# Patient Record
Sex: FEMALE | Race: WHITE | NOT HISPANIC OR LATINO | Employment: OTHER | URBAN - METROPOLITAN AREA
[De-identification: names, ages, dates, MRNs, and addresses within clinical notes are randomized per-mention and may not be internally consistent; named-entity substitution may affect disease eponyms.]

---

## 2023-03-15 PROBLEM — I49.3 FREQUENT PVCS: Status: ACTIVE | Noted: 2023-03-15

## 2023-03-15 PROBLEM — G89.29 CHRONIC LEFT SHOULDER PAIN: Status: ACTIVE | Noted: 2023-03-15

## 2023-03-15 PROBLEM — N81.10 PELVIC ORGAN PROLAPSE QUANTIFICATION STAGE 3 CYSTOCELE: Status: ACTIVE | Noted: 2023-03-15

## 2023-03-15 PROBLEM — R00.2 PALPITATIONS: Status: ACTIVE | Noted: 2023-03-15

## 2023-03-15 PROBLEM — M41.9 SCOLIOSIS/KYPHOSCOLIOSIS: Status: ACTIVE | Noted: 2023-03-15

## 2023-03-15 PROBLEM — F41.9 ANXIETY: Status: ACTIVE | Noted: 2023-03-15

## 2023-03-15 PROBLEM — M48.062 SPINAL STENOSIS OF LUMBAR REGION WITH NEUROGENIC CLAUDICATION: Status: ACTIVE | Noted: 2023-03-15

## 2023-03-15 PROBLEM — M17.11 PRIMARY OSTEOARTHRITIS OF RIGHT KNEE: Status: ACTIVE | Noted: 2023-03-15

## 2023-03-15 PROBLEM — E03.9 HYPOTHYROIDISM: Status: ACTIVE | Noted: 2023-03-15

## 2023-03-15 PROBLEM — M25.512 CHRONIC LEFT SHOULDER PAIN: Status: ACTIVE | Noted: 2023-03-15

## 2023-03-15 PROBLEM — K21.9 GERD (GASTROESOPHAGEAL REFLUX DISEASE): Status: ACTIVE | Noted: 2023-03-15

## 2023-03-15 PROBLEM — N81.2 CYSTOCELE WITH INCOMPLETE UTEROVAGINAL PROLAPSE: Status: ACTIVE | Noted: 2023-03-15

## 2023-03-15 PROBLEM — G47.00 INSOMNIA: Status: ACTIVE | Noted: 2023-03-15

## 2023-03-15 PROBLEM — E78.49 ESSENTIAL FAMILIAL HYPERLIPIDEMIA: Status: ACTIVE | Noted: 2023-03-15

## 2023-03-15 PROBLEM — K59.00 CONSTIPATION: Status: ACTIVE | Noted: 2023-03-15

## 2023-03-15 PROBLEM — M25.562 LEFT KNEE PAIN: Status: ACTIVE | Noted: 2023-03-15

## 2023-03-15 PROBLEM — E87.6 HYPOKALEMIA: Status: ACTIVE | Noted: 2023-03-15

## 2023-03-15 PROBLEM — L65.9 ALOPECIA: Status: ACTIVE | Noted: 2023-03-15

## 2023-03-15 PROBLEM — M54.50 LOW BACK PAIN WITH RADIATION: Status: ACTIVE | Noted: 2023-03-15

## 2023-03-15 PROBLEM — R23.2 FLUSHING REACTION: Status: ACTIVE | Noted: 2023-03-15

## 2023-03-15 PROBLEM — I49.9 REGULARLY IRREGULAR PULSE RHYTHYM: Status: ACTIVE | Noted: 2023-03-15

## 2023-03-15 PROBLEM — I10 BENIGN ESSENTIAL HYPERTENSION: Status: ACTIVE | Noted: 2023-03-15

## 2023-03-15 PROBLEM — M85.88 OSTEOPENIA OF SPINE: Status: ACTIVE | Noted: 2023-03-15

## 2023-03-15 PROBLEM — H81.10 BPV (BENIGN POSITIONAL VERTIGO): Status: ACTIVE | Noted: 2023-03-15

## 2023-03-15 PROBLEM — M25.561 RIGHT KNEE PAIN: Status: ACTIVE | Noted: 2023-03-15

## 2023-03-15 PROBLEM — M76.61 ACHILLES TENDINITIS OF RIGHT LOWER EXTREMITY: Status: ACTIVE | Noted: 2023-03-15

## 2023-03-15 PROBLEM — R00.1 BRADYCARDIA: Status: ACTIVE | Noted: 2023-03-15

## 2023-03-15 PROBLEM — M19.041 PRIMARY OSTEOARTHRITIS OF BOTH HANDS: Status: ACTIVE | Noted: 2023-03-15

## 2023-03-15 PROBLEM — I25.10 ASCVD (ARTERIOSCLEROTIC CARDIOVASCULAR DISEASE): Status: ACTIVE | Noted: 2023-03-15

## 2023-03-15 PROBLEM — I45.10 COMPLETE RIGHT BUNDLE BRANCH BLOCK: Status: ACTIVE | Noted: 2023-03-15

## 2023-03-15 PROBLEM — G89.29 CHRONIC PAIN: Status: ACTIVE | Noted: 2023-03-15

## 2023-03-15 PROBLEM — R42 FEELING LIGHT HEADED: Status: ACTIVE | Noted: 2023-03-15

## 2023-03-15 PROBLEM — I49.5 TACHY-BRADY SYNDROME (MULTI): Status: ACTIVE | Noted: 2023-03-15

## 2023-03-15 PROBLEM — K58.9 IBS (IRRITABLE COLON SYNDROME): Status: ACTIVE | Noted: 2023-03-15

## 2023-03-15 PROBLEM — R14.0 ABDOMINAL BLOATING: Status: ACTIVE | Noted: 2023-03-15

## 2023-03-15 PROBLEM — N95.2 ATROPHY OF VAGINA: Status: ACTIVE | Noted: 2023-03-15

## 2023-03-15 PROBLEM — I49.8 BIGEMINY: Status: ACTIVE | Noted: 2023-03-15

## 2023-03-15 PROBLEM — M47.9 OSTEOARTHRITIS OF SPINE: Status: ACTIVE | Noted: 2023-03-15

## 2023-03-15 PROBLEM — R23.2 HOT FLASHES: Status: ACTIVE | Noted: 2023-03-15

## 2023-03-15 PROBLEM — F41.8 DEPRESSION WITH ANXIETY: Status: ACTIVE | Noted: 2023-03-15

## 2023-03-15 PROBLEM — E87.5 HYPERKALEMIA: Status: ACTIVE | Noted: 2023-03-15

## 2023-03-15 PROBLEM — R10.30 ABDOMINAL PAIN, LOWER: Status: ACTIVE | Noted: 2023-03-15

## 2023-03-15 PROBLEM — M19.042 PRIMARY OSTEOARTHRITIS OF BOTH HANDS: Status: ACTIVE | Noted: 2023-03-15

## 2023-03-15 PROBLEM — G89.18 POSTOPERATIVE PAIN: Status: ACTIVE | Noted: 2023-03-15

## 2023-03-15 PROBLEM — N39.3 FEMALE STRESS INCONTINENCE: Status: ACTIVE | Noted: 2023-03-15

## 2023-03-15 PROBLEM — H93.19 TINNITUS: Status: ACTIVE | Noted: 2023-03-15

## 2023-03-15 PROBLEM — E06.3 HASHIMOTO'S THYROIDITIS: Status: ACTIVE | Noted: 2023-03-15

## 2023-03-15 PROBLEM — R11.0 NAUSEA IN ADULT: Status: ACTIVE | Noted: 2023-03-15

## 2023-03-15 RX ORDER — IPRATROPIUM BROMIDE 42 UG/1
2 SPRAY, METERED NASAL 4 TIMES DAILY
COMMUNITY
End: 2024-02-19 | Stop reason: SDUPTHER

## 2023-03-15 RX ORDER — HYALURONATE SODIUM, STABILIZED 60 MG/3 ML
60 SYRINGE (ML) INTRAARTICULAR ONCE
COMMUNITY

## 2023-03-15 RX ORDER — ELECTROLYTES/DEXTROSE
5 SOLUTION, ORAL ORAL DAILY
COMMUNITY
Start: 2018-02-08

## 2023-03-15 RX ORDER — ONDANSETRON 4 MG/1
4 TABLET, ORALLY DISINTEGRATING ORAL EVERY 6 HOURS PRN
COMMUNITY
End: 2024-02-13 | Stop reason: SDUPTHER

## 2023-03-15 RX ORDER — CHOLECALCIFEROL (VITAMIN D3) 50 MCG
1 TABLET ORAL DAILY
COMMUNITY
Start: 2017-12-15

## 2023-03-15 RX ORDER — ZOLPIDEM TARTRATE 10 MG/1
.5-1 TABLET ORAL NIGHTLY PRN
COMMUNITY
Start: 2016-10-19 | End: 2023-05-12 | Stop reason: SDUPTHER

## 2023-03-15 RX ORDER — LEVOTHYROXINE SODIUM 100 UG/1
1 TABLET ORAL DAILY
COMMUNITY
Start: 2021-04-29 | End: 2023-05-12 | Stop reason: SDUPTHER

## 2023-03-15 RX ORDER — SIMVASTATIN 20 MG/1
20 TABLET, FILM COATED ORAL NIGHTLY
COMMUNITY
Start: 2016-10-19 | End: 2023-03-22 | Stop reason: SDUPTHER

## 2023-03-15 RX ORDER — BETAMETHASONE VALERATE 1.2 MG/G
AEROSOL, FOAM TOPICAL
COMMUNITY
Start: 2022-05-23

## 2023-03-15 RX ORDER — AMLODIPINE BESYLATE 5 MG/1
5 TABLET ORAL NIGHTLY
COMMUNITY
Start: 2022-04-28 | End: 2023-05-22 | Stop reason: SDUPTHER

## 2023-03-15 RX ORDER — BUSPIRONE HYDROCHLORIDE 5 MG/1
5 TABLET ORAL 2 TIMES DAILY
COMMUNITY
End: 2023-03-16 | Stop reason: ALTCHOICE

## 2023-03-16 ENCOUNTER — OFFICE VISIT (OUTPATIENT)
Dept: PRIMARY CARE | Facility: CLINIC | Age: 88
End: 2023-03-16
Payer: MEDICARE

## 2023-03-16 VITALS
OXYGEN SATURATION: 97 % | HEART RATE: 71 BPM | DIASTOLIC BLOOD PRESSURE: 77 MMHG | SYSTOLIC BLOOD PRESSURE: 127 MMHG | RESPIRATION RATE: 12 BRPM

## 2023-03-16 DIAGNOSIS — I10 BENIGN ESSENTIAL HYPERTENSION: Primary | ICD-10-CM

## 2023-03-16 DIAGNOSIS — Z00.00 HEALTH CARE MAINTENANCE: ICD-10-CM

## 2023-03-16 DIAGNOSIS — F41.9 ANXIETY: ICD-10-CM

## 2023-03-16 PROCEDURE — 3074F SYST BP LT 130 MM HG: CPT | Performed by: INTERNAL MEDICINE

## 2023-03-16 PROCEDURE — 3078F DIAST BP <80 MM HG: CPT | Performed by: INTERNAL MEDICINE

## 2023-03-16 PROCEDURE — 99213 OFFICE O/P EST LOW 20 MIN: CPT | Performed by: INTERNAL MEDICINE

## 2023-03-16 RX ORDER — BUSPIRONE HYDROCHLORIDE 5 MG/1
5 TABLET ORAL 2 TIMES DAILY
Qty: 60 TABLET | Refills: 0 | Status: SHIPPED | OUTPATIENT
Start: 2023-03-16 | End: 2023-09-14

## 2023-03-16 NOTE — PROGRESS NOTES
Subjective   Patient ID: Pastora Dowling is a 87 y.o. female who presents for No chief complaint on file..    HPI follow-up visit and sick visit has been anxious she had been on BuSpar previously has not refilled has been on gabapentin previously prefers not to take she is sleeping better with the zolpidem no chest pain no shortness of breath but sometimes feels her heart pounding in her chest but not particularly fast or slow    OARRS:  No data recorded  I have personally reviewed the OARRS report for Pastora Dowling. I have considered the risks of abuse, dependence, addiction and diversion    Is the patient prescribed a combination of a benzodiazepine and opioid?  Yes, I feel it is clincially indicated to continue the medication and have discussed with the patient risks/benefits/alternatives.    Last Urine Drug Screen / ordered today: No  Recent Results (from the past 93484 hour(s))   Drug Screen, Urine With Reflex to Confirmation    Collection Time: 02/02/23 11:36 AM   Result Value Ref Range    DRUG SCREEN COMMENT URINE SEE BELOW     Amphetamine Screen, Urine PRESUMPTIVE NEGATIVE NEGATIVE    Barbiturate Screen, Urine PRESUMPTIVE NEGATIVE NEGATIVE    BENZODIAZEPINE (PRESENCE) IN URINE BY SCREEN METHOD PRESUMPTIVE NEGATIVE NEGATIVE    Cannabinoid Screen, Urine PRESUMPTIVE NEGATIVE NEGATIVE    Cocaine Screen, Urine PRESUMPTIVE NEGATIVE NEGATIVE    Fentanyl, Ur PRESUMPTIVE NEGATIVE NEGATIVE    Methadone Screen, Urine PRESUMPTIVE NEGATIVE NEGATIVE    Opiate Screen, Urine PRESUMPTIVE NEGATIVE NEGATIVE    Oxycodone Screen, Ur PRESUMPTIVE NEGATIVE NEGATIVE    PCP Screen, Urine PRESUMPTIVE NEGATIVE NEGATIVE     Results are as expected.     Controlled Substance Agreement:  Date of the Last Agreement: na  Reviewed Controlled Substance Agreement including but not limited to the benefits, risks, and alternatives to treatment with a Controlled Substance medication(s).    Sleep Aids:   What is the patient's goal of  therapy? sleep  Is this being achieved with current treatment? y    Activities of Daily Living:   Is your overall impression that this patient is benefiting (symptom reduction outweighs side effects) from sleep aid therapy? Yes     1. Physical Functioning: Same  2. Family Relationship: Same  3. Social Relationship: Same  4. Mood: Same  5. Sleep Patterns: Same  6. Overall Function: Same    Review of Systems    Objective   There were no vitals taken for this visit.    Physical Exam vital signs noted alert and oriented x3 NCAT no JVD or bruit chest clear to auscultation and percussion CV regular rate and rhythm S1-S2 without murmur gallop or rub except for 1 out of 6 systolic ejection murmur extremities no tremor no clubbing cyanosis or edema normal distal pulses    Assessment/Plan impression hypertension anxiety  Plan okay to resume BuSpar 5 mg 1 p.o. twice daily for at least 1 month and then reassess discussed with nonpharmacological measures for decreasing anxiety continue with blood pressure management and medication watching diet sleep hygiene continue with Ambien if helpful discussed with daughter recheck 1 month

## 2023-03-22 DIAGNOSIS — E78.2 MIXED HYPERLIPIDEMIA: Primary | ICD-10-CM

## 2023-03-24 RX ORDER — SIMVASTATIN 20 MG/1
20 TABLET, FILM COATED ORAL NIGHTLY
Qty: 90 TABLET | Refills: 3 | Status: SHIPPED | OUTPATIENT
Start: 2023-03-24 | End: 2023-06-21 | Stop reason: SDUPTHER

## 2023-04-03 RX ORDER — ZOLPIDEM TARTRATE 10 MG/1
5-10 TABLET ORAL NIGHTLY PRN
Qty: 30 TABLET | Status: CANCELLED | OUTPATIENT
Start: 2023-04-03

## 2023-04-09 RX ORDER — ZOLPIDEM TARTRATE 10 MG/1
5-10 TABLET ORAL NIGHTLY PRN
Qty: 30 TABLET | Refills: 0 | OUTPATIENT
Start: 2023-04-09

## 2023-04-20 DIAGNOSIS — R30.0 DYSURIA: Primary | ICD-10-CM

## 2023-05-05 ENCOUNTER — TELEPHONE (OUTPATIENT)
Dept: PRIMARY CARE | Facility: CLINIC | Age: 88
End: 2023-05-05

## 2023-05-09 RX ORDER — ZOLPIDEM TARTRATE 10 MG/1
5-10 TABLET ORAL NIGHTLY PRN
Status: CANCELLED | OUTPATIENT
Start: 2023-05-09

## 2023-05-12 ENCOUNTER — OFFICE VISIT (OUTPATIENT)
Dept: PRIMARY CARE | Facility: CLINIC | Age: 88
End: 2023-05-12
Payer: MEDICARE

## 2023-05-12 VITALS — SYSTOLIC BLOOD PRESSURE: 129 MMHG | DIASTOLIC BLOOD PRESSURE: 76 MMHG

## 2023-05-12 DIAGNOSIS — F41.9 ANXIETY: ICD-10-CM

## 2023-05-12 DIAGNOSIS — K59.00 CONSTIPATION, UNSPECIFIED CONSTIPATION TYPE: ICD-10-CM

## 2023-05-12 DIAGNOSIS — Z00.00 HEALTH CARE MAINTENANCE: ICD-10-CM

## 2023-05-12 DIAGNOSIS — F51.01 PRIMARY INSOMNIA: ICD-10-CM

## 2023-05-12 DIAGNOSIS — M54.50 LOW BACK PAIN WITH RADIATION: Primary | ICD-10-CM

## 2023-05-12 DIAGNOSIS — I10 BENIGN ESSENTIAL HYPERTENSION: ICD-10-CM

## 2023-05-12 PROCEDURE — 3078F DIAST BP <80 MM HG: CPT | Performed by: INTERNAL MEDICINE

## 2023-05-12 PROCEDURE — 99213 OFFICE O/P EST LOW 20 MIN: CPT | Performed by: INTERNAL MEDICINE

## 2023-05-12 PROCEDURE — 3074F SYST BP LT 130 MM HG: CPT | Performed by: INTERNAL MEDICINE

## 2023-05-12 RX ORDER — ZOLPIDEM TARTRATE 10 MG/1
5-10 TABLET ORAL NIGHTLY PRN
Qty: 30 TABLET | Refills: 0 | Status: SHIPPED | OUTPATIENT
Start: 2023-05-12 | End: 2023-06-10 | Stop reason: SDUPTHER

## 2023-05-12 RX ORDER — MELOXICAM 7.5 MG/1
7.5 TABLET ORAL DAILY
Qty: 30 TABLET | Refills: 0 | Status: SHIPPED | OUTPATIENT
Start: 2023-05-12 | End: 2023-06-11

## 2023-05-12 RX ORDER — LEVOTHYROXINE SODIUM 100 UG/1
100 TABLET ORAL DAILY
Qty: 90 TABLET | Refills: 1 | Status: SHIPPED | OUTPATIENT
Start: 2023-05-12 | End: 2023-08-07 | Stop reason: SDUPTHER

## 2023-05-12 NOTE — PROGRESS NOTES
Subjective   Patient ID: Pastora Dowling is a 88 y.o. female who presents for No chief complaint on file..    HPI follow-up visit no chest pain no shortness of breath not sleeping well Ambien works for her she has had no subsequent falls she has been trying to find a new rheumatologist bowels have been slow    Review of Systems    Objective   There were no vitals taken for this visit.    Physical Exam vital signs noted alert and oriented x3 NCAT no JVD or bruit chest clear to auscultation and percussion CV regular rate and rhythm S1-S2 abdomen soft nontender normal active bowel sounds slightly distended LS spine nontender spinous process negative straight leg raise extremities no clubbing cyanosis or edema normal distal pulses    Assessment/Plan impression hypertension?  Anxiety?  Low back and hip buttock discomfort constipation versus gas and bloating  Plan okay to add stool softener 100 mg p.o. daily refill levothyroxine see EMR refill Ambien see EMR risk-benefit side effect reviewed with her and wishes to proceed review OARRS report medication agreement recent urinary screening test discussed with other medications watching diet increase fiber discussed with other gas and bloating medications okay for new rheumatology referral to be made and refill meloxicam 7.5 mg p.o. as needed take with food see EMR and recheck after that    Rheumatology referral

## 2023-05-22 DIAGNOSIS — I10 BENIGN ESSENTIAL HYPERTENSION: Primary | ICD-10-CM

## 2023-05-22 RX ORDER — AMLODIPINE BESYLATE 5 MG/1
5 TABLET ORAL NIGHTLY
Qty: 30 TABLET | Refills: 1 | Status: SHIPPED | OUTPATIENT
Start: 2023-05-22 | End: 2023-06-08 | Stop reason: SDUPTHER

## 2023-06-05 ENCOUNTER — OFFICE VISIT (OUTPATIENT)
Dept: PRIMARY CARE | Facility: CLINIC | Age: 88
End: 2023-06-05
Payer: MEDICARE

## 2023-06-05 ENCOUNTER — LAB (OUTPATIENT)
Dept: LAB | Facility: LAB | Age: 88
End: 2023-06-05
Payer: MEDICARE

## 2023-06-05 VITALS — DIASTOLIC BLOOD PRESSURE: 70 MMHG | SYSTOLIC BLOOD PRESSURE: 130 MMHG

## 2023-06-05 DIAGNOSIS — I10 BENIGN ESSENTIAL HYPERTENSION: ICD-10-CM

## 2023-06-05 DIAGNOSIS — F51.01 PRIMARY INSOMNIA: ICD-10-CM

## 2023-06-05 DIAGNOSIS — H61.21 IMPACTED CERUMEN OF RIGHT EAR: ICD-10-CM

## 2023-06-05 DIAGNOSIS — R30.0 DYSURIA: ICD-10-CM

## 2023-06-05 DIAGNOSIS — R14.0 ABDOMINAL BLOATING: ICD-10-CM

## 2023-06-05 DIAGNOSIS — R14.0 ABDOMINAL BLOATING: Primary | ICD-10-CM

## 2023-06-05 LAB
APPEARANCE, URINE: CLEAR
BILIRUBIN, URINE: NEGATIVE
BLOOD, URINE: NEGATIVE
COLOR, URINE: YELLOW
DEAMIDATED GLIADIN PEPTIDE IGA: <1 U/ML (ref 0–14)
DEAMIDATED GLIADIN PEPTIDE IGG: <1 U/ML (ref 0–14)
GLUCOSE, URINE: NEGATIVE MG/DL
KETONES, URINE: NEGATIVE MG/DL
LEUKOCYTE ESTERASE, URINE: NEGATIVE
NITRITE, URINE: NEGATIVE
PH, URINE: 5 (ref 5–8)
PROTEIN, URINE: NEGATIVE MG/DL
SPECIFIC GRAVITY, URINE: 1.03 (ref 1–1.03)
TISSUE TRANSGLUTAMINASE IGG: <1 U/ML (ref 0–14)
TISSUE TRANSGLUTAMINASE, IGA: <1 U/ML (ref 0–14)
UROBILINOGEN, URINE: <2 MG/DL (ref 0–1.9)

## 2023-06-05 PROCEDURE — 36415 COLL VENOUS BLD VENIPUNCTURE: CPT

## 2023-06-05 PROCEDURE — 3075F SYST BP GE 130 - 139MM HG: CPT | Performed by: INTERNAL MEDICINE

## 2023-06-05 PROCEDURE — 3078F DIAST BP <80 MM HG: CPT | Performed by: INTERNAL MEDICINE

## 2023-06-05 PROCEDURE — 81003 URINALYSIS AUTO W/O SCOPE: CPT

## 2023-06-05 PROCEDURE — 83516 IMMUNOASSAY NONANTIBODY: CPT

## 2023-06-05 PROCEDURE — 99213 OFFICE O/P EST LOW 20 MIN: CPT | Performed by: INTERNAL MEDICINE

## 2023-06-05 PROCEDURE — 69210 REMOVE IMPACTED EAR WAX UNI: CPT | Performed by: INTERNAL MEDICINE

## 2023-06-05 NOTE — PROGRESS NOTES
celiaSubjective   Patient ID: Pastora Dowling is a 88 y.o. female who presents for No chief complaint on file..    HPI follow-up visit no chest pain no shortness of breath having some lower abdominal gas bloating her family was tested for celiac disease she would like to be tested also may consider some antispasmodic if she has been having more regular bowel movements lately she had been to the audiologist and has cerumen impaction in her right ear her hearing aid is not working well at that point in time had dropped her entire pillbox some of which went down the vent and her bathroom she had been to the orthopedic surgeon and had pelvic x-rays done she is on some Celebrex    Review of Systems    Objective   There were no vitals taken for this visit.    Physical Exam vital signs noted alert and oriented x3 NCAT TM EACs clear except for right EAC with cerumen and her hearing aid and ear canal this was cleaned and loop removed with satisfaction TM opaque replacement of the earpiece now can hear without tinnitus no JVD chest clear to auscultation CV regular rate and rhythm S1-S2 abdomen soft nontender normal active bowel sounds some lower abdomen tympany but no different than before extremities no clubbing cyanosis or edema normal distal pulses walking with cane    Procedure note right EAC with cerumen impaction multiple loop removals and now clear TM visible opaque no complications hearing improved as well as subjective tinnitus may recheck in the future    Assessment/Plan impression abdominal gas and bloating cerumen impaction insomnia other diagnoses hypertension  Plan blood pressure adequate today May need early refill on her amlodipine Ambien risk-benefit side effect reviewed with her and wishes to proceed recheck OARRS report sleep hygiene may need some Bentyl for her abdomen she will check celiac antibodies evidently other family members have this she would like to check we will follow-up with audiologist  good diet regular exercise increase water consumption recheck if no better and for regular visit    Prescriptions for amlodipine Ambien and Bentyl (check)  Celiac antibodies (check)

## 2023-06-08 DIAGNOSIS — E78.2 MIXED HYPERLIPIDEMIA: ICD-10-CM

## 2023-06-08 DIAGNOSIS — Z00.00 HEALTH CARE MAINTENANCE: Primary | ICD-10-CM

## 2023-06-08 DIAGNOSIS — I10 BENIGN ESSENTIAL HYPERTENSION: ICD-10-CM

## 2023-06-10 DIAGNOSIS — F51.01 PRIMARY INSOMNIA: ICD-10-CM

## 2023-06-10 RX ORDER — DICYCLOMINE HYDROCHLORIDE 10 MG/1
10 CAPSULE ORAL 2 TIMES DAILY PRN
Qty: 30 CAPSULE | Refills: 0 | Status: SHIPPED | OUTPATIENT
Start: 2023-06-10 | End: 2023-07-10

## 2023-06-10 RX ORDER — ZOLPIDEM TARTRATE 10 MG/1
5-10 TABLET ORAL NIGHTLY PRN
Qty: 30 TABLET | Refills: 0 | Status: SHIPPED | OUTPATIENT
Start: 2023-06-10 | End: 2023-07-11 | Stop reason: SDUPTHER

## 2023-06-10 RX ORDER — AMLODIPINE BESYLATE 5 MG/1
5 TABLET ORAL NIGHTLY
Qty: 30 TABLET | Refills: 1 | Status: SHIPPED | OUTPATIENT
Start: 2023-06-10 | End: 2023-08-22 | Stop reason: SDUPTHER

## 2023-06-21 RX ORDER — SIMVASTATIN 20 MG/1
20 TABLET, FILM COATED ORAL NIGHTLY
Qty: 90 TABLET | Refills: 3 | Status: SHIPPED | OUTPATIENT
Start: 2023-06-21 | End: 2024-05-03 | Stop reason: SDUPTHER

## 2023-07-11 DIAGNOSIS — F51.01 PRIMARY INSOMNIA: ICD-10-CM

## 2023-07-13 RX ORDER — ZOLPIDEM TARTRATE 10 MG/1
5 TABLET ORAL NIGHTLY PRN
Qty: 30 TABLET | Refills: 0 | Status: SHIPPED | OUTPATIENT
Start: 2023-07-13 | End: 2023-08-07 | Stop reason: SDUPTHER

## 2023-07-19 ENCOUNTER — OFFICE VISIT (OUTPATIENT)
Dept: PRIMARY CARE | Facility: CLINIC | Age: 88
End: 2023-07-19
Payer: MEDICARE

## 2023-07-19 VITALS — HEART RATE: 66 BPM | DIASTOLIC BLOOD PRESSURE: 78 MMHG | SYSTOLIC BLOOD PRESSURE: 123 MMHG | RESPIRATION RATE: 12 BRPM

## 2023-07-19 DIAGNOSIS — F41.9 ANXIETY: Primary | ICD-10-CM

## 2023-07-19 DIAGNOSIS — F51.01 PRIMARY INSOMNIA: ICD-10-CM

## 2023-07-19 DIAGNOSIS — I10 BENIGN ESSENTIAL HYPERTENSION: ICD-10-CM

## 2023-07-19 PROCEDURE — 99213 OFFICE O/P EST LOW 20 MIN: CPT | Performed by: INTERNAL MEDICINE

## 2023-07-19 PROCEDURE — 3074F SYST BP LT 130 MM HG: CPT | Performed by: INTERNAL MEDICINE

## 2023-07-19 PROCEDURE — 3078F DIAST BP <80 MM HG: CPT | Performed by: INTERNAL MEDICINE

## 2023-07-19 PROCEDURE — 1125F AMNT PAIN NOTED PAIN PRSNT: CPT | Performed by: INTERNAL MEDICINE

## 2023-07-19 NOTE — PROGRESS NOTES
Subjective   Patient ID: Pastora Dowling is a 88 y.o. female who presents for No chief complaint on file..    HPI follow-up visit no chest pain no shortness of breath no side effect with medication risk-benefit side effect of her sleeping medications reviewed with her and wishes to proceed heart rates have been okay    Review of Systems    Objective   There were no vitals taken for this visit.  Vital signs noted alert and oriented x3 NCAT no JVD or bruit chest clear to auscultation and percussion CV regular rate and rhythm S1-S2 without murmur gallop or rub extremities no clubbing cyanosis or edema normal distal pulses  Physical Exam    Assessment/Plan    impression hypertension insomnia less alopecia anxiety  Plan risk-benefit side effect of Ambien reviewed with her and wishes to proceed sleep hygiene discussed with other medications good diet multivitamin regular exercise increase water consumption hair is growing back she feels this may be due to a somewhat lessened anxiety no additional treatment needed here continue with blood pressure management and medication recheck 3 months based on above

## 2023-08-07 ENCOUNTER — TELEPHONE (OUTPATIENT)
Dept: PRIMARY CARE | Facility: CLINIC | Age: 88
End: 2023-08-07
Payer: COMMERCIAL

## 2023-08-07 DIAGNOSIS — F51.01 PRIMARY INSOMNIA: ICD-10-CM

## 2023-08-07 DIAGNOSIS — Z00.00 HEALTH CARE MAINTENANCE: ICD-10-CM

## 2023-08-09 RX ORDER — ZOLPIDEM TARTRATE 10 MG/1
5 TABLET ORAL NIGHTLY PRN
Qty: 30 TABLET | Refills: 0 | Status: SHIPPED | OUTPATIENT
Start: 2023-08-09 | End: 2023-09-05 | Stop reason: SDUPTHER

## 2023-08-09 RX ORDER — LEVOTHYROXINE SODIUM 100 UG/1
100 TABLET ORAL DAILY
Qty: 90 TABLET | Refills: 1 | Status: SHIPPED | OUTPATIENT
Start: 2023-08-09 | End: 2023-11-08 | Stop reason: SDUPTHER

## 2023-08-22 DIAGNOSIS — I10 BENIGN ESSENTIAL HYPERTENSION: ICD-10-CM

## 2023-08-22 RX ORDER — AMLODIPINE BESYLATE 5 MG/1
5 TABLET ORAL NIGHTLY
Qty: 30 TABLET | Refills: 1 | Status: SHIPPED | OUTPATIENT
Start: 2023-08-22 | End: 2023-08-28 | Stop reason: SDUPTHER

## 2023-08-28 ENCOUNTER — TELEPHONE (OUTPATIENT)
Dept: PRIMARY CARE | Facility: CLINIC | Age: 88
End: 2023-08-28
Payer: COMMERCIAL

## 2023-08-28 DIAGNOSIS — I10 BENIGN ESSENTIAL HYPERTENSION: ICD-10-CM

## 2023-08-30 RX ORDER — AMLODIPINE BESYLATE 5 MG/1
5 TABLET ORAL NIGHTLY
Qty: 30 TABLET | Refills: 1 | Status: SHIPPED | OUTPATIENT
Start: 2023-08-30 | End: 2023-09-20 | Stop reason: SDUPTHER

## 2023-09-05 DIAGNOSIS — F51.01 PRIMARY INSOMNIA: ICD-10-CM

## 2023-09-07 RX ORDER — ZOLPIDEM TARTRATE 10 MG/1
5 TABLET ORAL NIGHTLY PRN
Qty: 30 TABLET | Refills: 0 | Status: SHIPPED | OUTPATIENT
Start: 2023-09-07 | End: 2023-09-14 | Stop reason: SDUPTHER

## 2023-09-08 DIAGNOSIS — F51.01 PRIMARY INSOMNIA: ICD-10-CM

## 2023-09-09 RX ORDER — ZOLPIDEM TARTRATE 10 MG/1
5 TABLET ORAL NIGHTLY PRN
Qty: 30 TABLET | Refills: 0 | OUTPATIENT
Start: 2023-09-09

## 2023-09-11 DIAGNOSIS — F51.01 PRIMARY INSOMNIA: ICD-10-CM

## 2023-09-11 RX ORDER — ZOLPIDEM TARTRATE 5 MG/1
5 TABLET ORAL NIGHTLY PRN
Qty: 30 TABLET | Refills: 0 | Status: CANCELLED | OUTPATIENT
Start: 2023-09-11

## 2023-09-14 ENCOUNTER — OFFICE VISIT (OUTPATIENT)
Dept: PRIMARY CARE | Facility: CLINIC | Age: 88
End: 2023-09-14
Payer: MEDICARE

## 2023-09-14 VITALS — HEART RATE: 66 BPM | DIASTOLIC BLOOD PRESSURE: 74 MMHG | SYSTOLIC BLOOD PRESSURE: 126 MMHG | RESPIRATION RATE: 12 BRPM

## 2023-09-14 DIAGNOSIS — I10 BENIGN ESSENTIAL HYPERTENSION: ICD-10-CM

## 2023-09-14 DIAGNOSIS — R14.0 ABDOMINAL BLOATING: ICD-10-CM

## 2023-09-14 DIAGNOSIS — F51.01 PRIMARY INSOMNIA: ICD-10-CM

## 2023-09-14 DIAGNOSIS — F41.9 ANXIETY: Primary | ICD-10-CM

## 2023-09-14 PROCEDURE — 99214 OFFICE O/P EST MOD 30 MIN: CPT | Performed by: INTERNAL MEDICINE

## 2023-09-14 PROCEDURE — 1125F AMNT PAIN NOTED PAIN PRSNT: CPT | Performed by: INTERNAL MEDICINE

## 2023-09-14 PROCEDURE — 3074F SYST BP LT 130 MM HG: CPT | Performed by: INTERNAL MEDICINE

## 2023-09-14 PROCEDURE — 3078F DIAST BP <80 MM HG: CPT | Performed by: INTERNAL MEDICINE

## 2023-09-14 RX ORDER — ZOLPIDEM TARTRATE 10 MG/1
TABLET ORAL
Qty: 30 TABLET | Refills: 0 | Status: SHIPPED | OUTPATIENT
Start: 2023-09-14 | End: 2023-10-13 | Stop reason: SDUPTHER

## 2023-09-14 RX ORDER — PAROXETINE 10 MG/1
TABLET, FILM COATED ORAL
Qty: 30 TABLET | Refills: 1 | Status: SHIPPED | OUTPATIENT
Start: 2023-09-14

## 2023-09-14 NOTE — PROGRESS NOTES
Subjective   Patient ID: Pastora Dowling is a 88 y.o. female who presents for No chief complaint on file..    HPI follow-up visit no chest pain no shortness of breath has been more anxious she had lost some of her Ambien medication inadvertently had swept it into the garbage while cleaning had been using 10 mg at night she had leftover trazodone but did not use that she has approximately 5 of those tablets left no side effect with medications risk-benefit side effect of Ambien reviewed with her and wishes to proceed bowels normal to slow sometimes feels bloated no dysuria   Review of Systems    Objective   There were no vitals taken for this visit.  Vital signs noted alert and oriented x3 NCAT anxious color is good TM EAC clear she had asked no JVD or bruit chest clear to auscultation and percussion CV regular rate and rhythm S1-S2 without murmur gallop or rub abdomen soft nontender nondistended normal active bowel sounds no flank tenderness extremities no clubbing cyanosis or edema normal distal pulses  Physical Exam    Assessment/Plan    impression hypertension insomnia abdominal gas and bloating other diagnoses anxiety  Plan discussed with anxiety she had tried buspirone in the past okay for low-dose Paxil 10 mg p.o. every morning discussed with use see EMR she has approximately 5 of the 50 mg trazodone's left at home discussed with not using sleeping pills together but if it make him to pass that she is out of sleeping pills forgot them lost them or they are not available she may take 1 of 50 mg trazodone at night while she awaits any future refills of Ambien check OARRS report medication screen for urineMedication agreement discussed with pain medication she is taking Tylenol she is seeing the rheumatologist she may have some tramadol left at home discussed with use and follow-up with rheumatology sleep hygiene continue with blood pressure medication for the abdomen discussed with food selection good water  consumption regular exercise occasional antacid review prior laboratory results recheck 1 to 3 months based on above  [id number] [id number]    Review prior laboratory results (check)  any abdominal imaging (CT scan) checkOARRS:  No data recorded  I have personally reviewed the OARRS report for Pastora Dowling. I have considered the risks of abuse, dependence, addiction and diversion    Is the patient prescribed a combination of a benzodiazepine and opioid?  No    Last Urine Drug Screen / ordered today: No  Recent Results (from the past 8760 hour(s))   Drug Screen, Urine With Reflex to Confirmation    Collection Time: 02/02/23 11:36 AM   Result Value Ref Range    DRUG SCREEN COMMENT URINE SEE BELOW     Amphetamine Screen, Urine PRESUMPTIVE NEGATIVE NEGATIVE    Barbiturate Screen, Urine PRESUMPTIVE NEGATIVE NEGATIVE    BENZODIAZEPINE (PRESENCE) IN URINE BY SCREEN METHOD PRESUMPTIVE NEGATIVE NEGATIVE    Cannabinoid Screen, Urine PRESUMPTIVE NEGATIVE NEGATIVE    Cocaine Screen, Urine PRESUMPTIVE NEGATIVE NEGATIVE    Fentanyl, Ur PRESUMPTIVE NEGATIVE NEGATIVE    Methadone Screen, Urine PRESUMPTIVE NEGATIVE NEGATIVE    Opiate Screen, Urine PRESUMPTIVE NEGATIVE NEGATIVE    Oxycodone Screen, Ur PRESUMPTIVE NEGATIVE NEGATIVE    PCP Screen, Urine PRESUMPTIVE NEGATIVE NEGATIVE     N/A  Clinical rationale for not completing a Urine Drug Screen:  not due yet    Controlled Substance Agreement:  Date of the Last Agreement: 2023  Reviewed Controlled Substance Agreement including but not limited to the benefits, risks, and alternatives to treatment with a Controlled Substance medication(s).    Sleep Aids:   What is the patient's goal of therapy? sleep  Is this being achieved with current treatment? y    Activities of Daily Living:   Is your overall impression that this patient is benefiting (symptom reduction outweighs side effects) from sleep aid therapy? Yes     1. Physical Functioning: Same  2. Family Relationship: Same  3. Social  Relationship: Same  4. Mood: Same  5. Sleep Patterns: Same  6. Overall Function: Same

## 2023-09-20 DIAGNOSIS — I10 BENIGN ESSENTIAL HYPERTENSION: ICD-10-CM

## 2023-09-20 RX ORDER — AMLODIPINE BESYLATE 5 MG/1
5 TABLET ORAL NIGHTLY
Qty: 30 TABLET | Refills: 1 | Status: SHIPPED | OUTPATIENT
Start: 2023-09-20 | End: 2023-10-20 | Stop reason: SDUPTHER

## 2023-09-22 ENCOUNTER — TELEPHONE (OUTPATIENT)
Dept: PRIMARY CARE | Facility: CLINIC | Age: 88
End: 2023-09-22
Payer: COMMERCIAL

## 2023-10-04 ENCOUNTER — OFFICE VISIT (OUTPATIENT)
Dept: RHEUMATOLOGY | Facility: CLINIC | Age: 88
End: 2023-10-04
Payer: MEDICARE

## 2023-10-04 VITALS — SYSTOLIC BLOOD PRESSURE: 149 MMHG | DIASTOLIC BLOOD PRESSURE: 91 MMHG | HEART RATE: 87 BPM

## 2023-10-04 DIAGNOSIS — M19.90 OSTEOARTHRITIS, UNSPECIFIED OSTEOARTHRITIS TYPE, UNSPECIFIED SITE: Primary | ICD-10-CM

## 2023-10-04 PROCEDURE — 20610 DRAIN/INJ JOINT/BURSA W/O US: CPT | Mod: PO | Performed by: INTERNAL MEDICINE

## 2023-10-04 PROCEDURE — 1159F MED LIST DOCD IN RCRD: CPT | Performed by: INTERNAL MEDICINE

## 2023-10-04 PROCEDURE — 99214 OFFICE O/P EST MOD 30 MIN: CPT | Performed by: INTERNAL MEDICINE

## 2023-10-04 PROCEDURE — 1160F RVW MEDS BY RX/DR IN RCRD: CPT | Performed by: INTERNAL MEDICINE

## 2023-10-04 PROCEDURE — 3077F SYST BP >= 140 MM HG: CPT | Performed by: INTERNAL MEDICINE

## 2023-10-04 PROCEDURE — 3080F DIAST BP >= 90 MM HG: CPT | Performed by: INTERNAL MEDICINE

## 2023-10-04 PROCEDURE — 1125F AMNT PAIN NOTED PAIN PRSNT: CPT | Performed by: INTERNAL MEDICINE

## 2023-10-04 PROCEDURE — 1036F TOBACCO NON-USER: CPT | Performed by: INTERNAL MEDICINE

## 2023-10-04 PROCEDURE — 20610 DRAIN/INJ JOINT/BURSA W/O US: CPT | Performed by: INTERNAL MEDICINE

## 2023-10-04 RX ORDER — TRAMADOL HYDROCHLORIDE 50 MG/1
50 TABLET ORAL EVERY 8 HOURS PRN
Qty: 20 TABLET | Refills: 0 | Status: SHIPPED | OUTPATIENT
Start: 2023-10-04 | End: 2023-10-04 | Stop reason: DRUGHIGH

## 2023-10-04 RX ORDER — TRAMADOL HYDROCHLORIDE 50 MG/1
50 TABLET ORAL EVERY 8 HOURS PRN
Qty: 10 TABLET | Refills: 0 | Status: SHIPPED | OUTPATIENT
Start: 2023-10-04 | End: 2023-10-11 | Stop reason: SDUPTHER

## 2023-10-04 RX ORDER — TRAMADOL HYDROCHLORIDE 50 MG/1
50 TABLET ORAL EVERY 8 HOURS PRN
Qty: 10 TABLET | Refills: 0 | Status: SHIPPED | OUTPATIENT
Start: 2023-10-04 | End: 2023-10-04 | Stop reason: SDUPTHER

## 2023-10-04 RX ORDER — TRAMADOL HYDROCHLORIDE 50 MG/1
50 TABLET ORAL EVERY 8 HOURS PRN
Qty: 20 TABLET | Refills: 0 | Status: SHIPPED | OUTPATIENT
Start: 2023-10-04 | End: 2023-10-04 | Stop reason: SDUPTHER

## 2023-10-04 ASSESSMENT — PAIN SCALES - GENERAL: PAINLEVEL: 4

## 2023-10-04 NOTE — PROGRESS NOTES
Subjective   Patient ID: Pastora Dowling is a 88 y.o. female who presents for No chief complaint on file..    HPI  89 yo old F with chronic low back pain, radiculopathy, spinal stenosis, following with pain management, b/l knee pain that responds to cortisone injections for about 2 months, chronic pain here for fu    She fell down a few weeks ago and having back pain radiates her left hip and leg.  I saw her last week and made steroid injection to her knees.  Her pelvis x-ray did not reveal any fracture, it showed degenerative changes.    Her left lower back pain improved after steroid injection but came back later.    08/23:  Her back pain is still there, it started after fell down 2 months ago.  X-ray did not show any fracture.  Tylenol did not help so much.  She reports left leg pain, especially below her left knee.  Knee pain is better after steroid injection, but she reports pain around left anserine bursa      Interval history:  The patient reports left site low back pain and B/L knee pain.  Also, she complains mild swelling in her right knee.  She is using Tylenol and Tramadol as she needed.    ROS  Joint pain in hands: negative  Joint swelling: negative  Morning stiffness and duration:   strength: normal  Oral ulcer: negative  Genital ulcer: negative  Raynaud phenomenon: negative  Chest pain/dyspnea: negative  Low back pain: negative  Visual problem: negative  Dry eyes/dry mouth: negative  Skin rash/scaling/psoriasis: negative       Objective     PEXAM  VS reviewed, WNL  General: Alert, no distress   HEENT: Normocephalic/atraumatic, No alopecia. PERRLA. Sclera white, conjunctiva pink, no malar rash. no oral or nasal ulcer. Oral cavity pink and moist, no erythema or exudate, dentition good.   Neck: supple  Respiratory: CTA B, no adventitious breath sounds  Cardiac: RRR, no murmurs, carotid, or bruits  Abdominal: symmetrical, soft, non-tender, non-distended, normoactive BSx4 quadrants, no CVA  tenderness or suprapubic tenderness  MSK: Joints of upper and lower extremities were assessed for synovitis and ROM.    +right knee effusion and pain  Extremities: no clubbing, no cyanosis, no edema  Skin: Skin warm and moist.   Neuro: non-focal, Strength 5/5 throughout. Normal gait. No cerebellar pathologic exam     Assessment/Plan   87 yo old F with chronc low back pain, radiculopathy, spinal stenosis, following with pain management, b/l knee pain that responds to cortisone injections for about 2 months, chronic pain here for fu    Also, she fell down 2-3 weeks ago and having left low back pain radiated her left leg.  She also reports knee pain. Knee pain and low back pain improved after steroid injection, but left lower back pain came back.  Her pelvis x-ray did not show any fracture, it showed degenerative changes.  Her previous lumbar MRI showed spinal canal stenosis.  Tylenol helps only mildly. She is using tramadol as she needed.  Her PExam revealed local left low back pain and right knee effusion.  B/L knee steroid injection was performed.    -will use Tylenol, tramadol prn  -will use Diclofenac gel  -will see her in 2 monthsPatient ID: Pastora Dowling is a 88 y.o. female.    Arthrocentesis    Date/Time: 10/4/2023 11:12 PM    Performed by: Gwyn Turcios MD  Authorized by: Gwyn Turcios MD    Consent:     Consent obtained:  Verbal    Consent given by:  Patient    Risks, benefits, and alternatives were discussed: yes      Risks discussed:  Bleeding, infection and pain  Universal protocol:     Procedure explained and questions answered to patient or proxy's satisfaction: yes      Relevant documents present and verified: yes      Test results available: yes      Imaging studies available: yes      Required blood products, implants, devices, and special equipment available: yes      Site/side marked: yes      Immediately prior to procedure, a time out was called: yes      Patient identity confirmed:   Verbally with patient  Location:     Location:  Knee    Knee:  R knee  Anesthesia:     Anesthesia method:  None  Procedure details:     Needle gauge:  22 G    Ultrasound guidance: no      Approach:  Medial    Steroid injected: yes      Specimen collected: no    Post-procedure details:     Dressing:  Adhesive bandage    Procedure completion:  Tolerated well, no immediate complications    B/L IA knee steroid injection was performed.  After sterile cleaning with alcohol, 40 mg Kenalog and 1 ml 1% lidocaine injection was perfomed. No complications developed.

## 2023-10-05 DIAGNOSIS — M19.90 OSTEOARTHRITIS, UNSPECIFIED OSTEOARTHRITIS TYPE, UNSPECIFIED SITE: ICD-10-CM

## 2023-10-10 DIAGNOSIS — F51.01 PRIMARY INSOMNIA: ICD-10-CM

## 2023-10-11 RX ORDER — TRAMADOL HYDROCHLORIDE 50 MG/1
50 TABLET ORAL EVERY 8 HOURS PRN
Qty: 10 TABLET | Refills: 0 | Status: SHIPPED | OUTPATIENT
Start: 2023-10-11 | End: 2023-10-16

## 2023-10-14 RX ORDER — ZOLPIDEM TARTRATE 10 MG/1
TABLET ORAL
Qty: 30 TABLET | Refills: 0 | Status: SHIPPED | OUTPATIENT
Start: 2023-10-14 | End: 2023-11-10 | Stop reason: SDUPTHER

## 2023-10-20 DIAGNOSIS — I10 BENIGN ESSENTIAL HYPERTENSION: ICD-10-CM

## 2023-10-21 RX ORDER — AMLODIPINE BESYLATE 5 MG/1
5 TABLET ORAL NIGHTLY
Qty: 30 TABLET | Refills: 2 | Status: SHIPPED | OUTPATIENT
Start: 2023-10-21 | End: 2024-02-13 | Stop reason: SDUPTHER

## 2023-11-02 ENCOUNTER — OFFICE VISIT (OUTPATIENT)
Dept: GYNECOLOGIC ONCOLOGY | Facility: CLINIC | Age: 88
End: 2023-11-02
Payer: MEDICARE

## 2023-11-02 VITALS
HEART RATE: 86 BPM | TEMPERATURE: 96.8 F | DIASTOLIC BLOOD PRESSURE: 72 MMHG | BODY MASS INDEX: 21.46 KG/M2 | SYSTOLIC BLOOD PRESSURE: 156 MMHG | WEIGHT: 125 LBS | RESPIRATION RATE: 18 BRPM | OXYGEN SATURATION: 98 %

## 2023-11-02 DIAGNOSIS — N89.0 VAIN I (VAGINAL INTRAEPITHELIAL NEOPLASIA GRADE I): Primary | ICD-10-CM

## 2023-11-02 PROCEDURE — 1159F MED LIST DOCD IN RCRD: CPT | Performed by: STUDENT IN AN ORGANIZED HEALTH CARE EDUCATION/TRAINING PROGRAM

## 2023-11-02 PROCEDURE — 3077F SYST BP >= 140 MM HG: CPT | Performed by: STUDENT IN AN ORGANIZED HEALTH CARE EDUCATION/TRAINING PROGRAM

## 2023-11-02 PROCEDURE — 1160F RVW MEDS BY RX/DR IN RCRD: CPT | Performed by: STUDENT IN AN ORGANIZED HEALTH CARE EDUCATION/TRAINING PROGRAM

## 2023-11-02 PROCEDURE — 99214 OFFICE O/P EST MOD 30 MIN: CPT | Mod: PO | Performed by: STUDENT IN AN ORGANIZED HEALTH CARE EDUCATION/TRAINING PROGRAM

## 2023-11-02 PROCEDURE — 1036F TOBACCO NON-USER: CPT | Performed by: STUDENT IN AN ORGANIZED HEALTH CARE EDUCATION/TRAINING PROGRAM

## 2023-11-02 PROCEDURE — 3078F DIAST BP <80 MM HG: CPT | Performed by: STUDENT IN AN ORGANIZED HEALTH CARE EDUCATION/TRAINING PROGRAM

## 2023-11-02 PROCEDURE — 99214 OFFICE O/P EST MOD 30 MIN: CPT | Performed by: STUDENT IN AN ORGANIZED HEALTH CARE EDUCATION/TRAINING PROGRAM

## 2023-11-02 PROCEDURE — 1126F AMNT PAIN NOTED NONE PRSNT: CPT | Performed by: STUDENT IN AN ORGANIZED HEALTH CARE EDUCATION/TRAINING PROGRAM

## 2023-11-02 ASSESSMENT — COLUMBIA-SUICIDE SEVERITY RATING SCALE - C-SSRS
6. HAVE YOU EVER DONE ANYTHING, STARTED TO DO ANYTHING, OR PREPARED TO DO ANYTHING TO END YOUR LIFE?: NO
2. HAVE YOU ACTUALLY HAD ANY THOUGHTS OF KILLING YOURSELF?: NO
1. IN THE PAST MONTH, HAVE YOU WISHED YOU WERE DEAD OR WISHED YOU COULD GO TO SLEEP AND NOT WAKE UP?: NO

## 2023-11-02 ASSESSMENT — PATIENT HEALTH QUESTIONNAIRE - PHQ9
1. LITTLE INTEREST OR PLEASURE IN DOING THINGS: NOT AT ALL
2. FEELING DOWN, DEPRESSED OR HOPELESS: NOT AT ALL
SUM OF ALL RESPONSES TO PHQ9 QUESTIONS 1 AND 2: 0

## 2023-11-02 ASSESSMENT — ENCOUNTER SYMPTOMS
LOSS OF SENSATION IN FEET: 0
DEPRESSION: 0
OCCASIONAL FEELINGS OF UNSTEADINESS: 0

## 2023-11-02 ASSESSMENT — PAIN SCALES - GENERAL: PAINLEVEL: 0-NO PAIN

## 2023-11-02 NOTE — PROGRESS NOTES
Patient ID: Pastora Dowling is a 88 y.o. female.  Referring Physician: No referring provider defined for this encounter.  Primary Care Provider: Jerome Woodward MD    Subjective    Interval History:  Notes she has Psoriasis and hair loss that is starting to regrow and bowel movements and appetite are normal, expressed she has constipation and a hardened abdomen today.          Past Medical History: arthritis, HTN, hearing loss, breast cancer on tamoxifen, hypothyroidism, HLD     Past Surgical History: partial thyroidectomy, ablation, left breast lumpectomy     Family History: No history of ovarian/fallopian tube, endometrial, breast, pancreas, or GI cancers.      Social History: smoking: denies, alcohol use: occasional, other drug use: none    Objective    BSA: 1.6 meters squared  /72   Pulse 86   Temp 36 °C (96.8 °F)   Resp 18   Wt 56.7 kg (125 lb)   SpO2 98%   BMI 21.46 kg/m²      Physical Exam  Constitutional:       Appearance: Normal appearance. She is normal weight.   HENT:      Head: Normocephalic.      Mouth/Throat:      Mouth: Mucous membranes are moist.   Eyes:      Pupils: Pupils are equal, round, and reactive to light.   Cardiovascular:      Rate and Rhythm: Normal rate and regular rhythm.      Heart sounds: No murmur heard.     No friction rub. No gallop.   Pulmonary:      Effort: Pulmonary effort is normal.      Breath sounds: Normal breath sounds.   Abdominal:      General: Abdomen is flat. Bowel sounds are normal.      Palpations: Abdomen is soft.   Genitourinary:     Comments: Normal external female genitalia without lesions or masses  Speculum exam: Smooth vagina without lesions or masses surgically absent cervix, vaginal canal is shortened from prior colpocleisis   Bimanual exam: smooth vagina without lesions or masses, notable for large stool burden in the rectum       Musculoskeletal:         General: No swelling.   Skin:     General: Skin is warm and dry.   Neurological:       Mental Status: She is alert.         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 ECOG performance status: 1. Comorbidities include: arthritis, HTN, hearing loss, breast cancer  on tamoxifen.     # VAIN  - Natural history of VAIN discussed with patient and daughter in detail. Discussed VaIN as a spectrum of disease ranging from low grade to high grade lesions. Pathology  c/w with low-grade lesion with very low chance of progression of malignancy, especially in light of recent excision. Nevertheless, recommend close surveillance although recognize this will be limited due to recent colpocleisis.      Normal exam, no visible lesions. RTC 6 months    Scribe Attestation  By signing my name below, I, Christ Rose   attest that this documentation has been prepared under the direction and in the presence of Norma Lord MD.     Provider Attestation - Scribe documentation    All medical record entries made by the Scribe were at my direction and personally dictated by me. I have reviewed the chart and agree that the record accurately reflects my personal performance of the history, physical exam, discussion and plan.    Norma Lord MD

## 2023-11-08 DIAGNOSIS — Z00.00 HEALTH CARE MAINTENANCE: ICD-10-CM

## 2023-11-08 RX ORDER — LEVOTHYROXINE SODIUM 100 UG/1
100 TABLET ORAL DAILY
Qty: 90 TABLET | Refills: 1 | Status: SHIPPED | OUTPATIENT
Start: 2023-11-08 | End: 2024-02-07 | Stop reason: SDUPTHER

## 2023-11-10 DIAGNOSIS — F51.01 PRIMARY INSOMNIA: ICD-10-CM

## 2023-11-11 RX ORDER — ZOLPIDEM TARTRATE 10 MG/1
TABLET ORAL
Qty: 30 TABLET | Refills: 0 | Status: SHIPPED | OUTPATIENT
Start: 2023-11-11 | End: 2023-12-11 | Stop reason: SDUPTHER

## 2023-12-11 DIAGNOSIS — F51.01 PRIMARY INSOMNIA: ICD-10-CM

## 2023-12-13 RX ORDER — ZOLPIDEM TARTRATE 10 MG/1
TABLET ORAL
Qty: 30 TABLET | Refills: 0 | Status: SHIPPED | OUTPATIENT
Start: 2023-12-13 | End: 2024-01-10 | Stop reason: SDUPTHER

## 2023-12-20 ENCOUNTER — APPOINTMENT (OUTPATIENT)
Dept: RHEUMATOLOGY | Facility: CLINIC | Age: 88
End: 2023-12-20
Payer: COMMERCIAL

## 2023-12-22 ENCOUNTER — LAB (OUTPATIENT)
Dept: LAB | Facility: LAB | Age: 88
End: 2023-12-22
Payer: MEDICARE

## 2023-12-22 ENCOUNTER — OFFICE VISIT (OUTPATIENT)
Dept: RHEUMATOLOGY | Facility: CLINIC | Age: 88
End: 2023-12-22
Payer: MEDICARE

## 2023-12-22 VITALS
SYSTOLIC BLOOD PRESSURE: 137 MMHG | TEMPERATURE: 97.3 F | HEART RATE: 75 BPM | RESPIRATION RATE: 18 BRPM | DIASTOLIC BLOOD PRESSURE: 65 MMHG

## 2023-12-22 DIAGNOSIS — M19.90 OSTEOARTHRITIS, UNSPECIFIED OSTEOARTHRITIS TYPE, UNSPECIFIED SITE: ICD-10-CM

## 2023-12-22 DIAGNOSIS — D64.9 ANEMIA, UNSPECIFIED TYPE: ICD-10-CM

## 2023-12-22 DIAGNOSIS — M19.90 ARTHRITIS: Primary | ICD-10-CM

## 2023-12-22 DIAGNOSIS — L40.9 PSORIASIS: ICD-10-CM

## 2023-12-22 DIAGNOSIS — M19.90 ARTHRITIS: ICD-10-CM

## 2023-12-22 LAB
ALBUMIN SERPL BCP-MCNC: 4.3 G/DL (ref 3.4–5)
ALP SERPL-CCNC: 94 U/L (ref 33–136)
ALT SERPL W P-5'-P-CCNC: 9 U/L (ref 7–45)
ANION GAP SERPL CALC-SCNC: 14 MMOL/L (ref 10–20)
AST SERPL W P-5'-P-CCNC: 15 U/L (ref 9–39)
BILIRUB SERPL-MCNC: 0.5 MG/DL (ref 0–1.2)
BUN SERPL-MCNC: 24 MG/DL (ref 6–23)
CALCIUM SERPL-MCNC: 9.2 MG/DL (ref 8.6–10.6)
CHLORIDE SERPL-SCNC: 103 MMOL/L (ref 98–107)
CO2 SERPL-SCNC: 26 MMOL/L (ref 21–32)
CREAT SERPL-MCNC: 0.58 MG/DL (ref 0.5–1.05)
CRP SERPL-MCNC: 0.2 MG/DL
ERYTHROCYTE [DISTWIDTH] IN BLOOD BY AUTOMATED COUNT: 13.7 % (ref 11.5–14.5)
ERYTHROCYTE [SEDIMENTATION RATE] IN BLOOD BY WESTERGREN METHOD: 19 MM/H (ref 0–30)
GFR SERPL CREATININE-BSD FRML MDRD: 87 ML/MIN/1.73M*2
GLUCOSE SERPL-MCNC: 95 MG/DL (ref 74–99)
HBV SURFACE AG SERPL QL IA: NONREACTIVE
HCT VFR BLD AUTO: 37.1 % (ref 36–46)
HCV AB SER QL: NONREACTIVE
HGB BLD-MCNC: 11.9 G/DL (ref 12–16)
MCH RBC QN AUTO: 31 PG (ref 26–34)
MCHC RBC AUTO-ENTMCNC: 32.1 G/DL (ref 32–36)
MCV RBC AUTO: 97 FL (ref 80–100)
NRBC BLD-RTO: 0 /100 WBCS (ref 0–0)
PLATELET # BLD AUTO: 226 X10*3/UL (ref 150–450)
POTASSIUM SERPL-SCNC: 4.3 MMOL/L (ref 3.5–5.3)
PROT SERPL-MCNC: 6.9 G/DL (ref 6.4–8.2)
RBC # BLD AUTO: 3.84 X10*6/UL (ref 4–5.2)
SODIUM SERPL-SCNC: 139 MMOL/L (ref 136–145)
WBC # BLD AUTO: 9 X10*3/UL (ref 4.4–11.3)

## 2023-12-22 PROCEDURE — 1160F RVW MEDS BY RX/DR IN RCRD: CPT | Performed by: INTERNAL MEDICINE

## 2023-12-22 PROCEDURE — 36415 COLL VENOUS BLD VENIPUNCTURE: CPT

## 2023-12-22 PROCEDURE — 86140 C-REACTIVE PROTEIN: CPT

## 2023-12-22 PROCEDURE — 2500000004 HC RX 250 GENERAL PHARMACY W/ HCPCS (ALT 636 FOR OP/ED): Performed by: INTERNAL MEDICINE

## 2023-12-22 PROCEDURE — 89060 EXAM SYNOVIAL FLUID CRYSTALS: CPT | Performed by: INTERNAL MEDICINE

## 2023-12-22 PROCEDURE — 89051 BODY FLUID CELL COUNT: CPT | Performed by: INTERNAL MEDICINE

## 2023-12-22 PROCEDURE — 2500000005 HC RX 250 GENERAL PHARMACY W/O HCPCS: Performed by: INTERNAL MEDICINE

## 2023-12-22 PROCEDURE — 20610 DRAIN/INJ JOINT/BURSA W/O US: CPT | Performed by: INTERNAL MEDICINE

## 2023-12-22 PROCEDURE — 3075F SYST BP GE 130 - 139MM HG: CPT | Performed by: INTERNAL MEDICINE

## 2023-12-22 PROCEDURE — 1159F MED LIST DOCD IN RCRD: CPT | Performed by: INTERNAL MEDICINE

## 2023-12-22 PROCEDURE — 86803 HEPATITIS C AB TEST: CPT

## 2023-12-22 PROCEDURE — 99214 OFFICE O/P EST MOD 30 MIN: CPT | Performed by: INTERNAL MEDICINE

## 2023-12-22 PROCEDURE — 1036F TOBACCO NON-USER: CPT | Performed by: INTERNAL MEDICINE

## 2023-12-22 PROCEDURE — 87340 HEPATITIS B SURFACE AG IA: CPT

## 2023-12-22 PROCEDURE — 3078F DIAST BP <80 MM HG: CPT | Performed by: INTERNAL MEDICINE

## 2023-12-22 PROCEDURE — 80053 COMPREHEN METABOLIC PANEL: CPT

## 2023-12-22 PROCEDURE — 1125F AMNT PAIN NOTED PAIN PRSNT: CPT | Performed by: INTERNAL MEDICINE

## 2023-12-22 PROCEDURE — 85652 RBC SED RATE AUTOMATED: CPT

## 2023-12-22 PROCEDURE — 86481 TB AG RESPONSE T-CELL SUSP: CPT

## 2023-12-22 PROCEDURE — 85027 COMPLETE CBC AUTOMATED: CPT

## 2023-12-22 RX ORDER — LIDOCAINE HYDROCHLORIDE 10 MG/ML
1 INJECTION INFILTRATION; PERINEURAL
Status: COMPLETED | OUTPATIENT
Start: 2023-12-22 | End: 2023-12-22

## 2023-12-22 RX ADMIN — TRIAMCINOLONE ACETONIDE 40 MG: 10 INJECTION, SUSPENSION INTRA-ARTICULAR; INTRALESIONAL at 13:46

## 2023-12-22 RX ADMIN — LIDOCAINE HYDROCHLORIDE 1 ML: 10 INJECTION, SOLUTION INFILTRATION; PERINEURAL at 13:46

## 2023-12-22 ASSESSMENT — PAIN SCALES - GENERAL: PAINLEVEL: 7

## 2023-12-22 NOTE — PROGRESS NOTES
Subjective   Patient ID: Pastora Dowling is a 88 y.o. female who presents for Follow-up.    HPI  87 yo old F with chronic low back pain, radiculopathy, spinal stenosis, following with pain management, b/l knee pain that responds to cortisone injections for about 2 months, chronic pain here for fu     She fell down a few weeks ago and having back pain radiates her left hip and leg.  I saw her last week and made steroid injection to her knees.  Her pelvis x-ray did not reveal any fracture, it showed degenerative changes.     Her left lower back pain improved after steroid injection but came back later.     08/23:  Her back pain is still there, it started after fell down 2 months ago.  X-ray did not show any fracture.  Tylenol did not help so much.  She reports left leg pain, especially below her left knee.  Knee pain is better after steroid injection, but she reports pain around left anserine bursa      Interval history:  The patient reports left site low back pain and B/L knee pain.  Also, knee swelling both site  She is using Tylenol and Tramadol as she needed.  Also, she has h/o psoriasis in her legs and scalp.    ROS  Joint pain in hands: negative  Joint swelling: negative  Morning stiffness and duration: positive 20 min   strength: normal  Oral ulcer: negative  Genital ulcer: negative  Raynaud phenomenon: negative  Chest pain/dyspnea: negative  Low back pain: negative  Visual problem: negative  Dry eyes/dry mouth: negative  Skin rash/scaling/psoriasis: negative       Objective     PEXAM  VS reviewed, WNL  General: Alert, no distress   HEENT: Normocephalic/atraumatic, No alopecia. PERRLA. Sclera white, conjunctiva pink, no malar rash. no oral or nasal ulcer. Oral cavity pink and moist, no erythema or exudate, dentition good.   Neck: supple  Respiratory: CTA B, no adventitious breath sounds  Cardiac: RRR, no murmurs, carotid, or bruits  Abdominal: symmetrical, soft, non-tender, non-distended, normoactive  BSx4 quadrants, no CVA tenderness or suprapubic tenderness  MSK: Joints of upper and lower extremities were assessed for synovitis and ROM.    B/L knee effusion, especially at right knee   Extremities: no clubbing, no cyanosis, no edema  Skin: Skin warm and moist.   Neuro: non-focal, Strength 5/5 throughout. Normal gait. No cerebellar pathologic exam     Assessment/Plan   89 yo old F with chronc low back pain, radiculopathy, spinal stenosis, following with pain management, b/l knee pain that responds to cortisone injections for about 2 months, chronic pain here for fu     She fell down  a few months ago and having left low back pain radiated her left leg.  She also reports knee pain. Knee pain and low back pain improved after steroid injection, but left lower back pain came back.  Her pelvis x-ray did not show any fracture, it showed degenerative changes.  Her previous lumbar MRI showed spinal canal stenosis.  Tylenol helps only mildly. She is using tramadol as she needed.  Her PExam revealed local left low back pain and right knee effusion.  B/L knee steroid injection was performed.  Also, SF was aspirated from right knee  She has h/o psoriasis and recurrent knee effusion.  She has also back pain but, started after an injury and pelvis x-ray did not show any sacroiliitis.    -will use Tylenol  -will see her acute phases  -will check her SF cell counts  -if there is inflammatory fluid we will consider MTX or TNFi for possible PsA    Patient ID: Pastora Dowling is a 88 y.o. female.    Large Joint Injection/Arthrocentesis: bilateral knee on 12/22/2023 1:46 PM  Details: 22 G needle, medial approach  Medications (Right): 40 mg triamcinolone acetonide 10 mg/mL; 1 mL lidocaine 10 mg/mL (1 %)  Aspirate (Right): clear  Consent was given by the patient. Patient was prepped and draped in the usual sterile fashion.

## 2023-12-23 LAB
BASOPHILS NFR FLD MANUAL: 0 %
BLASTS NFR FLD MANUAL: 0 %
CLARITY FLD: CLEAR
COLOR FLD: YELLOW
CRYSTALS FLD MICRO: NORMAL
EOSINOPHIL NFR FLD MANUAL: 0 %
IMMATURE GRANULOCYTES IN FLUID: 0 %
LYMPHOCYTES NFR FLD MANUAL: 32 %
MONOS+MACROS NFR FLD MANUAL: 63 %
NEUTROPHILS NFR FLD MANUAL: 5 %
OTHER CELLS NFR FLD MANUAL: 0 %
PLASMA CELLS NFR FLD MANUAL: 0 %
RBC # FLD AUTO: 50 /UL
TOTAL CELLS COUNTED SNV: 100
WBC # FLD AUTO: 162 /UL

## 2023-12-24 LAB
NIL(NEG) CONTROL SPOT COUNT: NORMAL
PANEL A SPOT COUNT: 1
PANEL B SPOT COUNT: 0
POS CONTROL SPOT COUNT: NORMAL
T-SPOT. TB INTERPRETATION: NEGATIVE

## 2024-01-08 ENCOUNTER — TELEPHONE (OUTPATIENT)
Dept: PRIMARY CARE | Facility: CLINIC | Age: 89
End: 2024-01-08

## 2024-01-08 DIAGNOSIS — I10 BENIGN ESSENTIAL HYPERTENSION: ICD-10-CM

## 2024-01-08 DIAGNOSIS — F51.01 PRIMARY INSOMNIA: ICD-10-CM

## 2024-01-08 RX ORDER — ZOLPIDEM TARTRATE 10 MG/1
TABLET ORAL
Qty: 30 TABLET | Refills: 0 | Status: CANCELLED | OUTPATIENT
Start: 2024-01-08

## 2024-01-10 DIAGNOSIS — F51.01 PRIMARY INSOMNIA: ICD-10-CM

## 2024-01-10 RX ORDER — CLOBETASOL PROPIONATE 0.46 MG/ML
SOLUTION TOPICAL
COMMUNITY
Start: 2024-01-04

## 2024-01-12 ENCOUNTER — OFFICE VISIT (OUTPATIENT)
Dept: RHEUMATOLOGY | Facility: CLINIC | Age: 89
End: 2024-01-12
Payer: MEDICARE

## 2024-01-12 VITALS
HEIGHT: 64 IN | DIASTOLIC BLOOD PRESSURE: 68 MMHG | BODY MASS INDEX: 21.46 KG/M2 | HEART RATE: 86 BPM | TEMPERATURE: 97.3 F | SYSTOLIC BLOOD PRESSURE: 121 MMHG

## 2024-01-12 DIAGNOSIS — M19.90 OSTEOARTHRITIS, UNSPECIFIED OSTEOARTHRITIS TYPE, UNSPECIFIED SITE: Primary | ICD-10-CM

## 2024-01-12 DIAGNOSIS — L40.9 PSORIASIS: ICD-10-CM

## 2024-01-12 PROCEDURE — 99214 OFFICE O/P EST MOD 30 MIN: CPT | Performed by: INTERNAL MEDICINE

## 2024-01-12 PROCEDURE — 1036F TOBACCO NON-USER: CPT | Performed by: INTERNAL MEDICINE

## 2024-01-12 PROCEDURE — 3074F SYST BP LT 130 MM HG: CPT | Performed by: INTERNAL MEDICINE

## 2024-01-12 PROCEDURE — 3078F DIAST BP <80 MM HG: CPT | Performed by: INTERNAL MEDICINE

## 2024-01-12 PROCEDURE — 1159F MED LIST DOCD IN RCRD: CPT | Performed by: INTERNAL MEDICINE

## 2024-01-12 PROCEDURE — 1125F AMNT PAIN NOTED PAIN PRSNT: CPT | Performed by: INTERNAL MEDICINE

## 2024-01-12 RX ORDER — TRAMADOL HYDROCHLORIDE 50 MG/1
50 TABLET ORAL EVERY 8 HOURS PRN
Qty: 20 TABLET | Refills: 0 | Status: SHIPPED | OUTPATIENT
Start: 2024-01-12 | End: 2024-02-01

## 2024-01-12 RX ORDER — CELECOXIB 100 MG/1
100 CAPSULE ORAL 2 TIMES DAILY
Qty: 60 CAPSULE | Refills: 0 | Status: SHIPPED | OUTPATIENT
Start: 2024-01-12 | End: 2024-02-11

## 2024-01-12 ASSESSMENT — PAIN SCALES - GENERAL: PAINLEVEL: 8

## 2024-01-12 NOTE — PROGRESS NOTES
Subjective   Patient ID: Pastora Dowling is a 88 y.o. female who presents for Pain.    HPI  87 yo old F with chronic low back pain, radiculopathy, spinal stenosis, following with pain management, b/l knee pain that responds to cortisone injections for about 2 months, chronic pain here for fu     She fell down a few weeks ago and having back pain radiates her left hip and leg.  I saw her last week and made steroid injection to her knees.  Her pelvis x-ray did not reveal any fracture, it showed degenerative changes.     Her left lower back pain improved after steroid injection but came back later.     08/23:  Her back pain is still there, it started after fell down 2 months ago.  X-ray did not show any fracture.  Tylenol did not help so much.  She reports left leg pain, especially below her left knee.  Knee pain is better after steroid injection, but she reports pain around left anserine bursa      Interval history:  The patient reports left site low back pain and B/L knee pain.  Also, she complains about swelling in her knees.  In December 23, we aspirated SF which showed non-inflammatory findings.  Also, she has h/o psoriasis in her legs and scalp.    ROS  Joint pain in hands: negative  Joint swelling: negative  Morning stiffness and duration:negative   strength: normal  Oral ulcer: negative  Genital ulcer: negative  Raynaud phenomenon: negative  Chest pain/dyspnea: negative  Low back pain: negative  Visual problem: negative  Dry eyes/dry mouth: negative  Skin rash/scaling/psoriasis: negative       Objective     PEXAM  VS reviewed, WNL  General: Alert, no distress   HEENT: Normocephalic/atraumatic, No alopecia. PERRLA. Sclera white, conjunctiva pink, no malar rash. no oral or nasal ulcer. Oral cavity pink and moist, no erythema or exudate, dentition good.   Neck: supple  Respiratory: CTA B, no adventitious breath sounds  Cardiac: RRR, no murmurs, carotid, or bruits  Abdominal: symmetrical, soft, non-tender,  non-distended, normoactive BSx4 quadrants, no CVA tenderness or suprapubic tenderness  MSK: Joints of upper and lower extremities were assessed for synovitis and ROM.    Today she has no evidence of synovitis in the joints of her hands or wrists, tender joint count 0, swollen joint count 0   +B/L tender knee joints and mild effusion in right knee  Extremities: no clubbing, no cyanosis, no edema  Skin: Skin warm and moist.   Neuro: non-focal, Strength 5/5 throughout. Normal gait. No cerebellar pathologic exam     Assessment/Plan   89 yo old F with chronc low back pain, radiculopathy, spinal stenosis, following with pain management, b/l knee pain that responds to cortisone injections for about 2 months, chronic pain here for fu     She fell down  a few months ago and having left low back pain radiated her left leg.  She also reports knee pain. Knee pain and low back pain improved after steroid injection, but left lower back pain came back.  Her pelvis x-ray did not show any fracture, it showed degenerative changes.  Her previous lumbar MRI showed spinal canal stenosis.  Tylenol helps only mildly. She is using tramadol as she needed.  Her PExam revealed local left low back pain and right knee effusion.  B/L knee steroid injection was performed.  Previously, we thought PsA, but she does not have inflammatory arthritis and her SF showed 500 cells, no crystals. Also, no other synovitis and her acute phases are completely normal.  It hink she has only OA and psoriasis.   She has h/o psoriasis and recurrent knee effusion, OA.  We did steroid injection 3 times last year, we planned to do HA injection next time.    -will use Tylenol and Cleberex or Tramadol as needed.  -will performe HA injection in 02/24

## 2024-01-13 RX ORDER — ZOLPIDEM TARTRATE 10 MG/1
TABLET ORAL
Qty: 30 TABLET | Refills: 0 | Status: SHIPPED | OUTPATIENT
Start: 2024-01-13 | End: 2024-02-09 | Stop reason: SDUPTHER

## 2024-01-16 DIAGNOSIS — M19.90 OSTEOARTHRITIS, UNSPECIFIED OSTEOARTHRITIS TYPE, UNSPECIFIED SITE: Primary | ICD-10-CM

## 2024-01-16 NOTE — PROGRESS NOTES
Per Dr. Turcios:     She received multiple steroid injections for her knee OA, but still have pain.  She will receive Euflexxa in February for her both knees at [facility].  Need your help for med, scheduling etc.

## 2024-01-22 ENCOUNTER — OFFICE VISIT (OUTPATIENT)
Dept: PRIMARY CARE | Facility: CLINIC | Age: 89
End: 2024-01-22
Payer: MEDICARE

## 2024-01-22 VITALS — SYSTOLIC BLOOD PRESSURE: 132 MMHG | RESPIRATION RATE: 12 BRPM | DIASTOLIC BLOOD PRESSURE: 73 MMHG | HEART RATE: 70 BPM

## 2024-01-22 DIAGNOSIS — I10 BENIGN ESSENTIAL HYPERTENSION: Primary | ICD-10-CM

## 2024-01-22 DIAGNOSIS — G47.00 INSOMNIA, UNSPECIFIED TYPE: ICD-10-CM

## 2024-01-22 DIAGNOSIS — F41.9 ANXIETY: ICD-10-CM

## 2024-01-22 PROCEDURE — 1036F TOBACCO NON-USER: CPT | Performed by: INTERNAL MEDICINE

## 2024-01-22 PROCEDURE — 3078F DIAST BP <80 MM HG: CPT | Performed by: INTERNAL MEDICINE

## 2024-01-22 PROCEDURE — 1159F MED LIST DOCD IN RCRD: CPT | Performed by: INTERNAL MEDICINE

## 2024-01-22 PROCEDURE — 1125F AMNT PAIN NOTED PAIN PRSNT: CPT | Performed by: INTERNAL MEDICINE

## 2024-01-22 PROCEDURE — 3075F SYST BP GE 130 - 139MM HG: CPT | Performed by: INTERNAL MEDICINE

## 2024-01-22 PROCEDURE — 99213 OFFICE O/P EST LOW 20 MIN: CPT | Performed by: INTERNAL MEDICINE

## 2024-01-22 NOTE — PROGRESS NOTES
Subjective   Patient ID: Pastora Dowling is a 88 y.o. female who presents for No chief complaint on file..    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    Physical Exam vital signs noted alert and oriented x 3 NCAT no JVD or bruit no lid lag no thyromegaly chest clear to auscultation CV regular rate and rhythm S1-S2 extremities no clubbing cyanosis or edema normal distal pulses DTR 2+    Assessment/Plan    impression insomnia anxiety hypertension?  Hyperlipidemia?  Hypothyroidism?  Plan reviewed blood work for her blood pressure advised on same and reeating and continue with medication  Advised on medication for lipids and checking blood work  Advised on urine medication screening test check [facility] report refill medication when due sleep hygiene  Advise on when the next TSH is needed  Had some blood work with rheum last month, and urine test/bw would otherwise be due next visit 1to 2 months  Good diet regular exercise increase water consumption  Regular and nonpharmacological therapy for helping with anxiety and follow-up in 1 to 3 months

## 2024-02-07 DIAGNOSIS — Z00.00 HEALTH CARE MAINTENANCE: ICD-10-CM

## 2024-02-07 RX ORDER — LEVOTHYROXINE SODIUM 100 UG/1
100 TABLET ORAL DAILY
Qty: 90 TABLET | Refills: 1 | Status: SHIPPED | OUTPATIENT
Start: 2024-02-07 | End: 2024-05-06 | Stop reason: SDUPTHER

## 2024-02-09 DIAGNOSIS — F51.01 PRIMARY INSOMNIA: ICD-10-CM

## 2024-02-10 DIAGNOSIS — Z79.899 MEDICATION MANAGEMENT: Primary | ICD-10-CM

## 2024-02-10 RX ORDER — ZOLPIDEM TARTRATE 10 MG/1
TABLET ORAL
Qty: 30 TABLET | Refills: 0 | Status: SHIPPED | OUTPATIENT
Start: 2024-02-10 | End: 2024-03-12 | Stop reason: SDUPTHER

## 2024-02-13 DIAGNOSIS — I10 BENIGN ESSENTIAL HYPERTENSION: ICD-10-CM

## 2024-02-13 DIAGNOSIS — Z00.00 HEALTH CARE MAINTENANCE: Primary | ICD-10-CM

## 2024-02-15 RX ORDER — ONDANSETRON 4 MG/1
4 TABLET, ORALLY DISINTEGRATING ORAL EVERY 6 HOURS PRN
Qty: 20 TABLET | Refills: 0 | Status: SHIPPED | OUTPATIENT
Start: 2024-02-15

## 2024-02-15 RX ORDER — AMLODIPINE BESYLATE 5 MG/1
5 TABLET ORAL NIGHTLY
Qty: 30 TABLET | Refills: 2 | Status: SHIPPED | OUTPATIENT
Start: 2024-02-15 | End: 2024-02-19 | Stop reason: SDUPTHER

## 2024-02-19 ENCOUNTER — OFFICE VISIT (OUTPATIENT)
Dept: PRIMARY CARE | Facility: CLINIC | Age: 89
End: 2024-02-19
Payer: MEDICARE

## 2024-02-19 VITALS — DIASTOLIC BLOOD PRESSURE: 75 MMHG | SYSTOLIC BLOOD PRESSURE: 136 MMHG

## 2024-02-19 DIAGNOSIS — Z00.00 HEALTH CARE MAINTENANCE: Primary | ICD-10-CM

## 2024-02-19 DIAGNOSIS — F51.01 PRIMARY INSOMNIA: ICD-10-CM

## 2024-02-19 DIAGNOSIS — I10 BENIGN ESSENTIAL HYPERTENSION: ICD-10-CM

## 2024-02-19 DIAGNOSIS — Z99.89 WALKER AS AMBULATION AID: ICD-10-CM

## 2024-02-19 DIAGNOSIS — K21.9 GASTROESOPHAGEAL REFLUX DISEASE WITHOUT ESOPHAGITIS: ICD-10-CM

## 2024-02-19 PROCEDURE — 1036F TOBACCO NON-USER: CPT | Performed by: INTERNAL MEDICINE

## 2024-02-19 PROCEDURE — 99213 OFFICE O/P EST LOW 20 MIN: CPT | Performed by: INTERNAL MEDICINE

## 2024-02-19 PROCEDURE — 1159F MED LIST DOCD IN RCRD: CPT | Performed by: INTERNAL MEDICINE

## 2024-02-19 PROCEDURE — 3078F DIAST BP <80 MM HG: CPT | Performed by: INTERNAL MEDICINE

## 2024-02-19 PROCEDURE — 3075F SYST BP GE 130 - 139MM HG: CPT | Performed by: INTERNAL MEDICINE

## 2024-02-19 PROCEDURE — 1125F AMNT PAIN NOTED PAIN PRSNT: CPT | Performed by: INTERNAL MEDICINE

## 2024-02-19 RX ORDER — AMLODIPINE BESYLATE 5 MG/1
5 TABLET ORAL NIGHTLY
Qty: 30 TABLET | Refills: 2 | Status: SHIPPED | OUTPATIENT
Start: 2024-02-19 | End: 2024-03-14 | Stop reason: SDUPTHER

## 2024-02-19 RX ORDER — IPRATROPIUM BROMIDE 42 UG/1
2 SPRAY, METERED NASAL 4 TIMES DAILY
Qty: 15 ML | Refills: 1 | Status: SHIPPED | OUTPATIENT
Start: 2024-02-19

## 2024-02-19 NOTE — PROGRESS NOTES
Subjective   Patient ID: Pastora Dowling is a 88 y.o. female who presents for No chief complaint on file..    HPI slowFollow-up visit no chest pain no shortness of breath but sometimes some burning epigastric and slightly lower discomfort bowels sometimes is not taking NSAIDs only Tylenol for her arthritis and psoriasis has follow-up with the dermatologist prior results reviewed    Review of Systems    Objective   There were no vitals taken for this visit.    Physical Exam  Dragon issue  Alert and oriented x 3 breathing unlabored not otherwise examined no JVD scalp appears to be free of psoriasis no lid lag no thyromegaly chest clear to auscultation CV regular rate and rhythm S1-S2 extremities no clubbing cyanosis or edema normal distal pulses abdomen soft nontender normal active bowel sounds no rebound or guarding no flank tenderness  Assessment/Plan   Impression hypertension GERD other diagnoses insomnia  Plan avoidance of NSAIDs okay for Tylenol up to 2000 mg/day in divided doses for arthritis sleep hygiene continue with other medications refill ipratropium nasal spray refilled amlodipine see EMR continue to watch diet regular exercise  Discussed with diet  Discussed with obtaining a walker for safety currently she uses a cane for gait instability okay for Prilosec 20 mg p.o. every morning times at least 1 month and then recheck follow-up based on above    Prescription for folding lightweight walker without wheels (check)

## 2024-03-01 ENCOUNTER — TELEPHONE (OUTPATIENT)
Dept: PRIMARY CARE | Facility: CLINIC | Age: 89
End: 2024-03-01
Payer: COMMERCIAL

## 2024-03-01 NOTE — TELEPHONE ENCOUNTER
Patient was prescribed sotyktu by her dermatologist and wanted to see what Dr. Woodward thought of her taking the medication, if it is safe, recommended, ect. Please advise.

## 2024-03-07 RX ORDER — TACROLIMUS 1 MG/G
OINTMENT TOPICAL
COMMUNITY
Start: 2024-02-29

## 2024-03-08 ENCOUNTER — OFFICE VISIT (OUTPATIENT)
Dept: RHEUMATOLOGY | Facility: CLINIC | Age: 89
End: 2024-03-08
Payer: MEDICARE

## 2024-03-08 VITALS
WEIGHT: 129 LBS | TEMPERATURE: 97.8 F | HEIGHT: 64 IN | BODY MASS INDEX: 22.02 KG/M2 | HEART RATE: 90 BPM | SYSTOLIC BLOOD PRESSURE: 154 MMHG | DIASTOLIC BLOOD PRESSURE: 69 MMHG

## 2024-03-08 DIAGNOSIS — M19.90 OSTEOARTHRITIS, UNSPECIFIED OSTEOARTHRITIS TYPE, UNSPECIFIED SITE: Primary | ICD-10-CM

## 2024-03-08 PROCEDURE — 20610 DRAIN/INJ JOINT/BURSA W/O US: CPT | Performed by: INTERNAL MEDICINE

## 2024-03-08 PROCEDURE — 3078F DIAST BP <80 MM HG: CPT | Performed by: INTERNAL MEDICINE

## 2024-03-08 PROCEDURE — 3077F SYST BP >= 140 MM HG: CPT | Performed by: INTERNAL MEDICINE

## 2024-03-08 PROCEDURE — 1036F TOBACCO NON-USER: CPT | Performed by: INTERNAL MEDICINE

## 2024-03-08 PROCEDURE — 1159F MED LIST DOCD IN RCRD: CPT | Performed by: INTERNAL MEDICINE

## 2024-03-08 PROCEDURE — 99214 OFFICE O/P EST MOD 30 MIN: CPT | Performed by: INTERNAL MEDICINE

## 2024-03-08 PROCEDURE — 1160F RVW MEDS BY RX/DR IN RCRD: CPT | Performed by: INTERNAL MEDICINE

## 2024-03-08 PROCEDURE — 1125F AMNT PAIN NOTED PAIN PRSNT: CPT | Performed by: INTERNAL MEDICINE

## 2024-03-08 PROCEDURE — 2500000004 HC RX 250 GENERAL PHARMACY W/ HCPCS (ALT 636 FOR OP/ED): Mod: JZ | Performed by: INTERNAL MEDICINE

## 2024-03-08 RX ADMIN — Medication 20 MG: at 12:14

## 2024-03-08 ASSESSMENT — PAIN SCALES - GENERAL: PAINLEVEL: 5

## 2024-03-08 NOTE — PROGRESS NOTES
Subjective   Patient ID: Pastora Dowling is a 88 y.o. female who presents for Follow-up (Follow up).    HPI  87 yo old F with chronic low back pain, radiculopathy, spinal stenosis, following with pain management, b/l knee pain that responds to cortisone injections for about 2 months, chronic pain here for fu     She fell down a few weeks ago and having back pain radiates her left hip and leg.  I saw her last week and made steroid injection to her knees.  Her pelvis x-ray did not reveal any fracture, it showed degenerative changes.     Her left lower back pain improved after steroid injection but came back later.     08/23:  Her back pain is still there, it started after fell down 2 months ago.  X-ray did not show any fracture.  Tylenol did not help so much.  She reports left leg pain, especially below her left knee.  Knee pain is better after steroid injection, but she reports pain around left anserine bursa      01/24:  The patient reports left site low back pain and B/L knee pain.  Also, she complains about swelling in her knees.  In December 23, we aspirated SF which showed non-inflammatory findings.  Also, she has h/o psoriasis in her legs and scalp.    03/24:  She received three steroid injections last year, but she has still knee pain and swelling.  SF showed non-inflammatory fluid.  She received her first HA injection to her left knee today.  ROS  Joint pain in hands: negative   Joint swelling: negative  Morning stiffness and duration: negative   strength: normal  Oral ulcer: negative  Genital ulcer: negative  Raynaud phenomenon: negative  Chest pain/dyspnea: negative  Low back pain: negative  Visual problem: negative  Dry eyes/dry mouth: negative  Skin rash/scaling/psoriasis: negative       Objective     PEXAM  VS reviewed, WNL  General: Alert, no distress   HEENT: Normocephalic/atraumatic, No alopecia. PERRLA. Sclera white, conjunctiva pink, no malar rash. no oral or nasal ulcer. Oral cavity pink  and moist, no erythema or exudate, dentition good.   Neck: supple  Respiratory: CTA B, no adventitious breath sounds  Cardiac: RRR, no murmurs, carotid, or bruits  Abdominal: symmetrical, soft, non-tender, non-distended, normoactive BSx4 quadrants, no CVA tenderness or suprapubic tenderness  MSK: Joints of upper and lower extremities were assessed for synovitis and ROM.    +mild effusion in her left knee  Extremities: no clubbing, no cyanosis, no edema  Skin: Skin warm and moist.   Neuro: non-focal, Strength 5/5 throughout. Normal gait. No cerebellar pathologic exam     Assessment/Plan   87 yo old F with chronic low back pain, radiculopathy, spinal stenosis, following with pain management, b/l knee pain that responds to cortisone injections for about 2 months, chronic pain here for fu     She fell down  a few months ago and having left low back pain radiated her left leg.  She also reports knee pain. Knee pain and low back pain improved after steroid injection, but left lower back pain came back.  Her pelvis x-ray did not show any fracture, it showed degenerative changes.  Her previous lumbar MRI showed spinal canal stenosis.  Tylenol helps only mildly. She is using tramadol as she needed.  Her PExam revealed local left low back pain and right knee effusion.  B/L knee steroid injection was performed.  Previously, we thought PsA, but she does not have inflammatory arthritis and her SF showed 500 cells, no crystals. Also, no other synovitis and her acute phases are completely normal.  I think she has only OA and psoriasis.   She has h/o psoriasis and recurrent knee effusion, OA.  We did steroid injection 3 times last year, we planned to do HA injection.  She received her first HA injection today.  She will receive second one next week.  -will use Tylenol and Cleberex or Tramadol as needed.    Patient ID: Pastora Dowling is a 88 y.o. female.    Large Joint Injection/Arthrocentesis: L knee on 3/8/2024 12:14  PM  Indications: pain and joint swelling  Details: 22 G needle, medial approach  Medications: 20 mg sodium hyaluronate (Euflexxa) intra-articular prefilled syringe 20 mg/2mL  Outcome: tolerated well, no immediate complications  Procedure, treatment alternatives, risks and benefits explained, specific risks discussed. Consent was given by the patient. Patient was prepped and draped in the usual sterile fashion.

## 2024-03-12 DIAGNOSIS — F51.01 PRIMARY INSOMNIA: ICD-10-CM

## 2024-03-14 ENCOUNTER — OFFICE VISIT (OUTPATIENT)
Dept: PRIMARY CARE | Facility: CLINIC | Age: 89
End: 2024-03-14
Payer: MEDICARE

## 2024-03-14 VITALS — DIASTOLIC BLOOD PRESSURE: 78 MMHG | SYSTOLIC BLOOD PRESSURE: 141 MMHG

## 2024-03-14 DIAGNOSIS — Z00.00 HEALTH CARE MAINTENANCE: Primary | ICD-10-CM

## 2024-03-14 DIAGNOSIS — I45.9 SKIPPED BEATS: ICD-10-CM

## 2024-03-14 DIAGNOSIS — L40.50 PSORIATIC ARTHRITIS (MULTI): ICD-10-CM

## 2024-03-14 DIAGNOSIS — I10 BENIGN ESSENTIAL HYPERTENSION: ICD-10-CM

## 2024-03-14 DIAGNOSIS — G47.00 INSOMNIA, UNSPECIFIED TYPE: ICD-10-CM

## 2024-03-14 PROCEDURE — 99213 OFFICE O/P EST LOW 20 MIN: CPT | Performed by: INTERNAL MEDICINE

## 2024-03-14 PROCEDURE — 1160F RVW MEDS BY RX/DR IN RCRD: CPT | Performed by: INTERNAL MEDICINE

## 2024-03-14 PROCEDURE — 1159F MED LIST DOCD IN RCRD: CPT | Performed by: INTERNAL MEDICINE

## 2024-03-14 PROCEDURE — 3078F DIAST BP <80 MM HG: CPT | Performed by: INTERNAL MEDICINE

## 2024-03-14 PROCEDURE — 1036F TOBACCO NON-USER: CPT | Performed by: INTERNAL MEDICINE

## 2024-03-14 PROCEDURE — 3077F SYST BP >= 140 MM HG: CPT | Performed by: INTERNAL MEDICINE

## 2024-03-14 RX ORDER — AMLODIPINE BESYLATE 5 MG/1
5 TABLET ORAL NIGHTLY
Qty: 30 TABLET | Refills: 2 | Status: SHIPPED | OUTPATIENT
Start: 2024-03-14

## 2024-03-14 RX ORDER — ZOLPIDEM TARTRATE 10 MG/1
TABLET ORAL
Qty: 30 TABLET | Refills: 0 | Status: SHIPPED | OUTPATIENT
Start: 2024-03-14 | End: 2024-04-10 | Stop reason: SDUPTHER

## 2024-03-14 NOTE — PROGRESS NOTES
Subjective   Patient ID: Pastora Dowling is a 88 y.o. female who presents for No chief complaint on file..    HPI Follow-up visit and sick visit had taken some home blood pressures were averaging over 160 although she did not feel any differently she has had no falls no change in her diet no chest pain no shortness of breath occasional palpitation and skipped the as before sleeping okay risk-benefit side effect of Ambien reviewed with her and wishes to proceed     vital signs noted alert and oriented x 3 NCAT no JVD chest clear to auscultation CV regular rate and rhythm S1-S2 with occasional skipped she has 2 out of 6 systolic ejection murmur no S3 extremities no clubbing cyanosis or edema normal distal pulses    Review of Systems    Objective   There were no vitals taken for this visit.    Physical Exam    Assessment/Plan impression hypertension murmur skipped beat insomnia arthritis  Plan with her psoriasis and joint pains would like to have a rollator walker requisition made refill amlodipine see EMR refill hand [facility] see EMR good diet regular exercise good water consumption sleep hygiene Tylenol as needed for her joint she also has new biological medicine from her dermatologist for the psoriasis (see message) which she will commence continue with blood pressure management noted beats same and unchanged continue with cardiac medication and recheck 1 to 3 months based on above

## 2024-03-15 ENCOUNTER — OFFICE VISIT (OUTPATIENT)
Dept: RHEUMATOLOGY | Facility: CLINIC | Age: 89
End: 2024-03-15
Payer: MEDICARE

## 2024-03-15 VITALS
BODY MASS INDEX: 22.53 KG/M2 | WEIGHT: 132 LBS | HEIGHT: 64 IN | HEART RATE: 84 BPM | TEMPERATURE: 97.7 F | DIASTOLIC BLOOD PRESSURE: 55 MMHG | SYSTOLIC BLOOD PRESSURE: 144 MMHG

## 2024-03-15 DIAGNOSIS — M19.90 OSTEOARTHRITIS, UNSPECIFIED OSTEOARTHRITIS TYPE, UNSPECIFIED SITE: Primary | ICD-10-CM

## 2024-03-15 PROCEDURE — 1036F TOBACCO NON-USER: CPT | Performed by: INTERNAL MEDICINE

## 2024-03-15 PROCEDURE — 3077F SYST BP >= 140 MM HG: CPT | Performed by: INTERNAL MEDICINE

## 2024-03-15 PROCEDURE — 3078F DIAST BP <80 MM HG: CPT | Performed by: INTERNAL MEDICINE

## 2024-03-15 PROCEDURE — 99213 OFFICE O/P EST LOW 20 MIN: CPT | Performed by: INTERNAL MEDICINE

## 2024-03-15 PROCEDURE — 1125F AMNT PAIN NOTED PAIN PRSNT: CPT | Performed by: INTERNAL MEDICINE

## 2024-03-15 PROCEDURE — 1160F RVW MEDS BY RX/DR IN RCRD: CPT | Performed by: INTERNAL MEDICINE

## 2024-03-15 PROCEDURE — 1159F MED LIST DOCD IN RCRD: CPT | Performed by: INTERNAL MEDICINE

## 2024-03-15 RX ORDER — MELOXICAM 7.5 MG/1
7.5 TABLET ORAL DAILY
Qty: 30 TABLET | Refills: 11 | Status: SHIPPED | OUTPATIENT
Start: 2024-03-15 | End: 2025-03-15

## 2024-03-15 ASSESSMENT — PAIN SCALES - GENERAL: PAINLEVEL: 7

## 2024-03-15 NOTE — PROGRESS NOTES
Subjective   Patient ID: Pastora Dowling is a 88 y.o. female who presents for Follow-up (Follow up).    HPI  89 yo old F with chronic low back pain, radiculopathy, spinal stenosis, following with pain management, b/l knee pain that responds to cortisone injections for about 2 months, chronic pain here for fu     She fell down a few weeks ago and having back pain radiates her left hip and leg.  I saw her last week and made steroid injection to her knees.  Her pelvis x-ray did not reveal any fracture, it showed degenerative changes.     Her left lower back pain improved after steroid injection but came back later.     08/23:  Her back pain is still there, it started after fell down 2 months ago.  X-ray did not show any fracture.  Tylenol did not help so much.  She reports left leg pain, especially below her left knee.  Knee pain is better after steroid injection, but she reports pain around left anserine bursa      01/24:  The patient reports left site low back pain and B/L knee pain.  Also, she complains about swelling in her knees.  In December 23, we aspirated SF which showed non-inflammatory findings.  Also, she has h/o psoriasis in her legs and scalp.     02/24:  She received three steroid injections last year, but she has still knee pain and swelling.  SF showed non-inflammatory fluid.  She received her first HA injection to her left knee today.    03/24:  After her first HA injection, she had some allergic reaction in her knee.  Also, her pain increased in her left lower extremity    ROS  Joint pain in hands: negative   Joint swelling: negative  Morning stiffness and duration: negative   strength: normal  Oral ulcer: negative  Genital ulcer: negative  Raynaud phenomenon: negative  Chest pain/dyspnea: negative  Low back pain: negative  Visual problem: negative  Dry eyes/dry mouth: negative  Skin rash/scaling/psoriasis: negative       Objective     PEXAM  VS reviewed, WNL  General: Alert, no distress    HEENT: Normocephalic/atraumatic, No alopecia. PERRLA. Sclera white, conjunctiva pink, no malar rash. no oral or nasal ulcer. Oral cavity pink and moist, no erythema or exudate, dentition good.   Neck: supple  Respiratory: CTA B, no adventitious breath sounds  Cardiac: RRR, no murmurs, carotid, or bruits  Abdominal: symmetrical, soft, non-tender, non-distended, normoactive BSx4 quadrants, no CVA tenderness or suprapubic tenderness  MSK: Joints of upper and lower extremities were assessed for synovitis and ROM.    Today she has no evidence of synovitis in the joints of her hands or wrists, tender joint count 0, swollen joint count 0   Extremities: no clubbing, no cyanosis, no edema  Skin: Skin warm and moist.   Neuro: non-focal, Strength 5/5 throughout. Normal gait. No cerebellar pathologic exam     Assessment/Plan   87 yo old F with chronic low back pain, radiculopathy, spinal stenosis, following with pain management, b/l knee pain that responds to cortisone injections for about 2 months, chronic pain here for fu     She fell down  a few months ago and having left low back pain radiated her left leg.  She also reports knee pain. Knee pain and low back pain improved after steroid injection, but left lower back pain came back.  Her pelvis x-ray did not show any fracture, it showed degenerative changes.  Her previous lumbar MRI showed spinal canal stenosis.  Tylenol helps only mildly. She is using tramadol as she needed.  Her PExam revealed local left low back pain and right knee effusion.  B/L knee steroid injection was performed.  Previously, we thought PsA, but she does not have inflammatory arthritis and her SF showed 500 cells, no crystals. Also, no other synovitis and her acute phases are completely normal.  I think she has only OA and psoriasis.   She has h/o psoriasis and recurrent knee effusion, OA.  We did steroid injection 3 times last year, we planned to do HA injection.  She received her first HA  injection last week, but she had mild allergic reaction in her left knee medial site. Also, her pain got worse.  Discussed with her and decided not to make any HA injection.  She will use Tylenol and Meloxicam 7.5 prn and tramadol prn

## 2024-03-18 ENCOUNTER — TELEPHONE (OUTPATIENT)
Dept: RHEUMATOLOGY | Facility: CLINIC | Age: 89
End: 2024-03-18
Payer: COMMERCIAL

## 2024-03-18 NOTE — TELEPHONE ENCOUNTER
Left detailed vm on home and mobile phone.   For her 3rd gel injection HA fuv appointment planned for 3/22, I cancelled it on the patient's behalf.    Since, it was discussed with her and provider, during last fuv on 3/15, does not want to continue gel injection to knee.   If she has further questions about this can call our rheum office at [phone].

## 2024-03-21 RX ORDER — CALCIPOTRIENE 50 UG/G
CREAM TOPICAL
COMMUNITY
Start: 2024-03-01

## 2024-03-22 ENCOUNTER — APPOINTMENT (OUTPATIENT)
Dept: RHEUMATOLOGY | Facility: CLINIC | Age: 89
End: 2024-03-22
Payer: MEDICARE

## 2024-03-22 DIAGNOSIS — M19.90 OSTEOARTHRITIS, UNSPECIFIED OSTEOARTHRITIS TYPE, UNSPECIFIED SITE: Primary | ICD-10-CM

## 2024-04-10 ENCOUNTER — TELEPHONE (OUTPATIENT)
Dept: PRIMARY CARE | Facility: CLINIC | Age: 89
End: 2024-04-10

## 2024-04-10 DIAGNOSIS — F51.01 PRIMARY INSOMNIA: ICD-10-CM

## 2024-04-11 RX ORDER — ZOLPIDEM TARTRATE 10 MG/1
TABLET ORAL
Qty: 30 TABLET | Refills: 0 | Status: SHIPPED | OUTPATIENT
Start: 2024-04-11 | End: 2024-05-10 | Stop reason: SDUPTHER

## 2024-04-16 DIAGNOSIS — M19.90 OSTEOARTHRITIS, UNSPECIFIED OSTEOARTHRITIS TYPE, UNSPECIFIED SITE: Primary | ICD-10-CM

## 2024-04-16 RX ORDER — TRAMADOL HYDROCHLORIDE 50 MG/1
50 TABLET ORAL EVERY 8 HOURS PRN
Qty: 20 TABLET | Refills: 0 | Status: SHIPPED | OUTPATIENT
Start: 2024-04-16 | End: 2024-04-26

## 2024-05-02 ENCOUNTER — APPOINTMENT (OUTPATIENT)
Dept: LAB | Facility: LAB | Age: 89
End: 2024-05-02
Payer: MEDICARE

## 2024-05-02 ENCOUNTER — LAB (OUTPATIENT)
Dept: LAB | Facility: LAB | Age: 89
End: 2024-05-02
Payer: MEDICARE

## 2024-05-02 ENCOUNTER — OFFICE VISIT (OUTPATIENT)
Dept: PRIMARY CARE | Facility: CLINIC | Age: 89
End: 2024-05-02
Payer: MEDICARE

## 2024-05-02 VITALS — DIASTOLIC BLOOD PRESSURE: 78 MMHG | SYSTOLIC BLOOD PRESSURE: 141 MMHG

## 2024-05-02 DIAGNOSIS — I10 BENIGN ESSENTIAL HYPERTENSION: ICD-10-CM

## 2024-05-02 DIAGNOSIS — G47.00 INSOMNIA, UNSPECIFIED TYPE: ICD-10-CM

## 2024-05-02 DIAGNOSIS — M54.50 LOW BACK PAIN WITH RADIATION: ICD-10-CM

## 2024-05-02 DIAGNOSIS — N39.0 URINARY TRACT INFECTION WITHOUT HEMATURIA, SITE UNSPECIFIED: Primary | ICD-10-CM

## 2024-05-02 DIAGNOSIS — Z79.899 MEDICATION MANAGEMENT: ICD-10-CM

## 2024-05-02 DIAGNOSIS — N39.0 URINARY TRACT INFECTION WITHOUT HEMATURIA, SITE UNSPECIFIED: ICD-10-CM

## 2024-05-02 PROCEDURE — 3077F SYST BP >= 140 MM HG: CPT | Performed by: INTERNAL MEDICINE

## 2024-05-02 PROCEDURE — 99213 OFFICE O/P EST LOW 20 MIN: CPT | Performed by: INTERNAL MEDICINE

## 2024-05-02 PROCEDURE — 81003 URINALYSIS AUTO W/O SCOPE: CPT

## 2024-05-02 PROCEDURE — 3078F DIAST BP <80 MM HG: CPT | Performed by: INTERNAL MEDICINE

## 2024-05-02 NOTE — PROGRESS NOTES
Intact Subjective   Patient ID: Pastora Dowling is a 88 y.o. female who presents for No chief complaint on file..    HPI sick visit no chest pain no shortness of breath no side effect with medication has some lower back lower abdominal discomfort with dysuria and frequency no fever no falls discussed with water consumption discussed with joints especially the low back and the hips    Review of Systems    Objective   There were no vitals taken for this visit.    Physical Exam vital signs noted alert and oriented x 3 NCAT no JVD chest clear to auscultation CV regular rate and rhythm S1-S2 LS-spine normal to straighten curvature nontender spinous process no flank tenderness negative straight leg raise abdomen soft nontender normal active bowel sounds extremities no clubbing cyanosis or edema normal distal pulses some mild venous stasis musculoskeletal mild if any tenderness over the left hip    Assessment/Plan impression cystitis low back pain other diagnoses psoriasis psoriatic arthritis hypertension insomnia osteoarthritis left hip and low back  Plan check urinalysis advised on possibility probability of infection irritation inflammation and whether it has to do with her back she will begin her new psoriasis medicine she has been holding on the Tylenol as needed for the back heating pad back hygiene back stretches continue with the cardiovascular medications bp and pulse ok   she may soon be due for a zolpidem refill there is already a urine medication screening test at the lab good water consumption follow-up based on above

## 2024-05-03 ENCOUNTER — TELEPHONE (OUTPATIENT)
Dept: RHEUMATOLOGY | Facility: CLINIC | Age: 89
End: 2024-05-03
Payer: COMMERCIAL

## 2024-05-03 DIAGNOSIS — E78.2 MIXED HYPERLIPIDEMIA: ICD-10-CM

## 2024-05-03 DIAGNOSIS — Z00.00 HEALTH CARE MAINTENANCE: ICD-10-CM

## 2024-05-03 LAB
APPEARANCE UR: CLEAR
BILIRUB UR STRIP.AUTO-MCNC: NEGATIVE MG/DL
COLOR UR: NORMAL
GLUCOSE UR STRIP.AUTO-MCNC: NORMAL MG/DL
KETONES UR STRIP.AUTO-MCNC: NEGATIVE MG/DL
LEUKOCYTE ESTERASE UR QL STRIP.AUTO: NEGATIVE
NITRITE UR QL STRIP.AUTO: NEGATIVE
PH UR STRIP.AUTO: 5 [PH]
PROT UR STRIP.AUTO-MCNC: NEGATIVE MG/DL
RBC # UR STRIP.AUTO: NEGATIVE /UL
SP GR UR STRIP.AUTO: 1.02
UROBILINOGEN UR STRIP.AUTO-MCNC: NORMAL MG/DL

## 2024-05-03 NOTE — TELEPHONE ENCOUNTER
----- Message from wGyn Turcios MD sent at 1/12/2024  3:21 PM EST -----  Hello,  She received multiple steroid injections for her knee OA, but still have pain.  She will receive Euflexxa in February for her both knees at [facility].  Need your help for med, scheduling etc.  Thank you  Gwyn

## 2024-05-04 RX ORDER — SIMVASTATIN 20 MG/1
20 TABLET, FILM COATED ORAL NIGHTLY
Qty: 90 TABLET | Refills: 1 | Status: SHIPPED | OUTPATIENT
Start: 2024-05-04

## 2024-05-07 RX ORDER — LEVOTHYROXINE SODIUM 100 UG/1
100 TABLET ORAL DAILY
Qty: 90 TABLET | Refills: 1 | Status: SHIPPED | OUTPATIENT
Start: 2024-05-07

## 2024-05-08 NOTE — PROGRESS NOTES
Patient ID: Pastora Dowling is a 89 y.o. female.  Referring Physician: No referring provider defined for this encounter.  Primary Care Provider: Jerome Woodward MD    Subjective    Interval History:  Pastora is doing well.  No vaginal burning, itching, irritation, bleeding, discharge or discomfort.  They deny fever, chills, chest pain, SOB, nausea, vomiting, diarrhea, constipation, dysuria, or any other concerning signs of symptoms.    She recently celebrated her 89th birthday and her partner just turned 99.        Past Medical History: arthritis, HTN, hearing loss, breast cancer on tamoxifen, hypothyroidism, HLD     Past Surgical History: partial thyroidectomy, ablation, left breast lumpectomy     Family History: No history of ovarian/fallopian tube, endometrial, breast, pancreas, or GI cancers.      Social History: smoking: denies, alcohol use: occasional, other drug use: none    Objective    BSA: 1.63 meters squared  /70   Pulse 52   Temp 36.5 °C (97.7 °F) (Core)   Resp 18   Wt 58.5 kg (129 lb)   SpO2 94%   BMI 22.14 kg/m²      Physical Exam  Constitutional:       Appearance: Normal appearance. She is normal weight.   HENT:      Head: Normocephalic.      Mouth/Throat:      Mouth: Mucous membranes are moist.   Eyes:      Pupils: Pupils are equal, round, and reactive to light.   Cardiovascular:      Rate and Rhythm: Normal rate and regular rhythm.      Heart sounds: No murmur heard.     No friction rub. No gallop.   Pulmonary:      Effort: Pulmonary effort is normal.      Breath sounds: Normal breath sounds.   Abdominal:      General: Abdomen is flat. Bowel sounds are normal.      Palpations: Abdomen is soft.   Genitourinary:     Comments: Normal external female genitalia without lesions or masses  Speculum exam: Smooth vagina without lesions or masses surgically absent cervix, vaginal canal is shortened from prior colpocleisis   Bimanual exam: smooth vagina without lesions or  masses      Musculoskeletal:         General: No swelling.   Skin:     General: Skin is warm and dry.   Neurological:      Mental Status: She is alert.         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 ECOG performance status: 1. Comorbidities include: arthritis, HTN, hearing loss, breast cancer on tamoxifen.     # VAIN  - Natural history of VAIN discussed with patient in detail. Discussed VaIN as a spectrum of disease ranging from low grade to high grade lesions. Pathology c/w with low-grade lesion with very low chance of progression of malignancy, especially in light of excision during colpocleisis. Nevertheless, recommend close surveillance although recognize this will be limited due to colpocleisis.      Normal exam, no visible lesions. RTC 6 months    Gonsalo Pickett MD  Seen with Dr. Lord    I saw and evaluated the patient. I personally obtained the key and critical portions of the history and physical exam or was physically present for key and critical portions performed by the resident/fellow. I reviewed the resident/fellow's documentation and discussed the patient with the resident/fellow. I agree with the resident/fellow's medical decision making as documented in the note.    Norma Lord MD

## 2024-05-09 ENCOUNTER — OFFICE VISIT (OUTPATIENT)
Dept: GYNECOLOGIC ONCOLOGY | Facility: CLINIC | Age: 89
End: 2024-05-09
Payer: MEDICARE

## 2024-05-09 VITALS
WEIGHT: 129 LBS | BODY MASS INDEX: 22.14 KG/M2 | DIASTOLIC BLOOD PRESSURE: 70 MMHG | OXYGEN SATURATION: 94 % | HEART RATE: 52 BPM | TEMPERATURE: 97.7 F | RESPIRATION RATE: 18 BRPM | SYSTOLIC BLOOD PRESSURE: 135 MMHG

## 2024-05-09 DIAGNOSIS — N95.2 ATROPHY OF VAGINA: ICD-10-CM

## 2024-05-09 DIAGNOSIS — N89.0 VAIN I (VAGINAL INTRAEPITHELIAL NEOPLASIA GRADE I): Primary | ICD-10-CM

## 2024-05-09 PROCEDURE — 3078F DIAST BP <80 MM HG: CPT | Performed by: STUDENT IN AN ORGANIZED HEALTH CARE EDUCATION/TRAINING PROGRAM

## 2024-05-09 PROCEDURE — 1160F RVW MEDS BY RX/DR IN RCRD: CPT | Performed by: STUDENT IN AN ORGANIZED HEALTH CARE EDUCATION/TRAINING PROGRAM

## 2024-05-09 PROCEDURE — 99214 OFFICE O/P EST MOD 30 MIN: CPT | Performed by: STUDENT IN AN ORGANIZED HEALTH CARE EDUCATION/TRAINING PROGRAM

## 2024-05-09 PROCEDURE — 1036F TOBACCO NON-USER: CPT | Performed by: STUDENT IN AN ORGANIZED HEALTH CARE EDUCATION/TRAINING PROGRAM

## 2024-05-09 PROCEDURE — 1159F MED LIST DOCD IN RCRD: CPT | Performed by: STUDENT IN AN ORGANIZED HEALTH CARE EDUCATION/TRAINING PROGRAM

## 2024-05-09 PROCEDURE — 3075F SYST BP GE 130 - 139MM HG: CPT | Performed by: STUDENT IN AN ORGANIZED HEALTH CARE EDUCATION/TRAINING PROGRAM

## 2024-05-09 PROCEDURE — 1125F AMNT PAIN NOTED PAIN PRSNT: CPT | Performed by: STUDENT IN AN ORGANIZED HEALTH CARE EDUCATION/TRAINING PROGRAM

## 2024-05-09 ASSESSMENT — PAIN SCALES - GENERAL: PAINLEVEL: 6

## 2024-05-10 DIAGNOSIS — F51.01 PRIMARY INSOMNIA: ICD-10-CM

## 2024-05-12 RX ORDER — ZOLPIDEM TARTRATE 10 MG/1
TABLET ORAL
Qty: 30 TABLET | Refills: 0 | Status: SHIPPED | OUTPATIENT
Start: 2024-05-12

## 2024-06-19 ENCOUNTER — OFFICE VISIT (OUTPATIENT)
Dept: PRIMARY CARE | Facility: CLINIC | Age: 89
End: 2024-06-19
Payer: MEDICARE

## 2024-06-19 DIAGNOSIS — Z00.00 HEALTH CARE MAINTENANCE: Primary | ICD-10-CM

## 2024-06-19 DIAGNOSIS — F51.01 PRIMARY INSOMNIA: ICD-10-CM

## 2024-06-19 DIAGNOSIS — M79.10 MYALGIA: ICD-10-CM

## 2024-06-19 PROCEDURE — 99213 OFFICE O/P EST LOW 20 MIN: CPT | Performed by: INTERNAL MEDICINE

## 2024-06-19 RX ORDER — ZOLPIDEM TARTRATE 10 MG/1
TABLET ORAL
Qty: 15 TABLET | Refills: 0 | Status: SHIPPED | OUTPATIENT
Start: 2024-06-19

## 2024-06-19 NOTE — PROGRESS NOTES
PatientSubjective   Patient ID: Pastora Dowling is a 89 y.o. female who presents for No chief complaint on file..    HPI Follow-up visit no chest pain no shortness of breath no side effect with medication although has had some soreness to her legs he is sleeping well she continuously contemplate some other medications in the Ambien but feels okay with this risk-benefit side effects reviewed with her and wishes to proceed    Review of Systems    Objective   There were no vitals taken for this visit.    Physical Exam vital signs noted alert oriented x 3 NCAT no JVD or bruit chest.  Auscultation and percussion CV regular rate and rhythm S1-S2 without murmur gallop or rub LS spine normal curvature negative straight leg raise extremities no clubbing cyanosis or edema normal distal pulses DTR 2+ normal gait    Assessment/Plan impression subdue Co. diagnosis insomnia, muscle aches and pains lower extremity  Plan okay to check urine medication screen (req previously made) medication agreement noted check aorrs report okay for Ambien prescription balance of her previous and then go back to regular prescription sleep hygiene okay for her medication from the rheumatologist she has not become this yet sedated to will hold the simvastatin for 1 month and then advise on the muscle aches and pains and wanted to return to this or a different medicine

## 2024-06-24 ENCOUNTER — EVALUATION (OUTPATIENT)
Dept: PHYSICAL THERAPY | Facility: CLINIC | Age: 89
End: 2024-06-24
Payer: MEDICARE

## 2024-06-24 DIAGNOSIS — G89.29 CHRONIC PAIN OF LEFT KNEE: Primary | ICD-10-CM

## 2024-06-24 DIAGNOSIS — M54.50 LOW BACK PAIN WITH RADIATION: ICD-10-CM

## 2024-06-24 DIAGNOSIS — M25.562 CHRONIC PAIN OF LEFT KNEE: Primary | ICD-10-CM

## 2024-06-24 DIAGNOSIS — M19.90 OSTEOARTHRITIS, UNSPECIFIED OSTEOARTHRITIS TYPE, UNSPECIFIED SITE: ICD-10-CM

## 2024-06-24 PROCEDURE — 97161 PT EVAL LOW COMPLEX 20 MIN: CPT | Mod: GP

## 2024-06-24 PROCEDURE — 97110 THERAPEUTIC EXERCISES: CPT | Mod: GP

## 2024-06-24 ASSESSMENT — ENCOUNTER SYMPTOMS
OCCASIONAL FEELINGS OF UNSTEADINESS: 1
DEPRESSION: 1
LOSS OF SENSATION IN FEET: 0

## 2024-06-24 NOTE — PROGRESS NOTES
Physical Therapy  Physical Therapy Orthopedic Evaluation    Patient Name: Pastora Dowling  MRN: [id number]  Today's Date: 2024  Time Calculation  Start Time: 1405  Stop Time: 1445  Time Calculation (min): 40 min    Insurance:  Visit number: 1 of [place]  Authorization info: No Auth  Insurance Type: Medicare and [place]  Medicare Certification Period: Beginnin24 Endin2024  Onset date: 3/5151233    General:  Reason for visit: OA  Referred by: Gwyn Turcios MD     Current Problem:  1. Chronic pain of left knee        2. Osteoarthritis, unspecified osteoarthritis type, unspecified site  Referral to Physical Therapy      3. Low back pain with radiation            Precautions:   Precautions  STEADI Fall Risk Score (The score of 4 or more indicates an increased risk of falling): 11      Medical History Form: Reviewed (scanned into chart)    Subjective:   Subjective   Chief Complaint: Patient presents to clinic with pain due to OA. Reports pain is most notable in the left knee and lower leg, but she also has pain in the left buttock/hip.  Has a hard time arising from a chair. Denies numbness or tingling.  Mild swelling in L foot.   ELISE: Chronic    Current Condition:   Worse    Pain:  Pain level currently:  7-8/10  Pain level at worst: 8/10  Pain level at best: 4/10  Location: Left knee, left hip  Description: Aching, sharp  Aggravating Factors: sit-stand, stairs, walking, standing  Relieving Factors:  sitting down, 4 Tylenol daily    Relevant Information:  PMH: HTN, Breast CA  Previous Tests/Imaging: No recent imaging  Previous Interventions/Treatments: Injections    Prior Level of Function (PLOF)  Patient previously independent with all ADLs. Currently at % of PLOF.  Seeks to remain active despite the pain.  Functional limitations: Ambulates with cane (x 1 year)  Exercise/Physical Activity: None  Work/School: Retired    Patients Living Environment: 1 story living area, occasionally uses stairs with  "railing to basement, lives with partner    Primary Language: English    Patient's Goal(s) for Therapy: Reduce pain    Red Flags: Do you have any of the following? No  Fever/chills, unexplained weight changes, dizziness/fainting, unexplained change in bowel or bladder functions, unexplained malaise or muscle weakness, night pain/sweats, numbness or tingling    Objective:  Objective     Posture: Genu valgus L >R, FH    Gait: Ambulates with standard cane, reduced speed and stride length    Palpation: Tender to L knee medial joint line     Other: Moderate difficulty following instructions for examination; daughter (Sonali) present for eval     ROM    Lumbar AROM (%)    Flexion: 50%  Extension: 50%  (R) Side Bend: 50%  (L) Side Bend: 50%  (R) Rotation: 60%  (L) Rotation: 60%      Hip PROM (Degrees)       (R)  (L)  Flexion:  120  120      ER:   45  45    IR:   35  30      Knee PROM (Degrees)       (R)  (L)  Flexion:  138  120     Extension:  -5  -8       Strength Testing    Hip     (R)  (L)  Flexion:  4-  3+     Extension:  Poor bridge height       Knee     (R)  (L)  Flexion:  4+  4     Extension:  4+  4    Ankle     (R)  (L)  Dorsiflexion:  4+  4+       Core Control:  Abdominals: 3  Pelvic Bridge: Poor bridge height         Flexibility       (R)  (L)  Hamstrings:  Mod phoenix Mod phoenix  Hip Flexors:  Mod phoenix Mod phoenix       Functional Movement  Sit to stand: Heavy UE reliance  Bed Mobility: Slow, labored       Balance    Single Leg Balance:  (R)Unable (L)Unable  Tandem Balance: (R) 6\"  (L)3\" (Min A for foot positioning)       Neuro:    Dermatomes: Intact to light touch    Outcome Measures:  Other Measures  Lower Extremity Funtional Score (LEFS): 38/80       Goals: Set and discussed today  Active       PT Problem       PT Goal 1       Start:  06/24/24    Expected End:  08/19/24       Bilateral Knee extension ROM to 0 to allow for more upright standing and improved gait mechanics   L knee flexion ROM improved to 125 degrees for " improved functional mobility.   Functional strength improved as demonstrated by ability to perform sit-stand with minimal UE reliance.    LE strength measures improved to at least 4/5.  LE flexibility limitations reduced to minimal limitation for improved functional mobility by week 6.   LEFS improved by at least 8 points by week 6.   Patient will rate pain < 4/10 at worst to improve ADL tolerance.  Independent with home exercise program for symptom reduction and functional gains.              Plan of care was developed with input and agreement by the patient      Treatment Performed:    Education: Home exercise program, plan of care, activity modifications, posture, pain management, and injury pathology       Instructed in initial home exercise program (Handout provided).    Personalized Home Exercise Program:  Access Code: [email or web address]  URL: [email or web address]  Date: 06/24/2024  Prepared by: Dayana Brennan    Exercises  - Supine Quadricep Sets  - 1 x daily - 7 x weekly - 15 reps - 5 seconds hold  - Small Range Straight Leg Raise  - 1 x daily - 7 x weekly - 2 sets - 10 reps  - Supine Heel Slide  - 1-2 x daily - 7 x weekly - 20 reps  - Beginner Bridge  - 1 x daily - 7 x weekly - 2 sets - 10 reps - 5 second hold    Assessment: Patient presents with signs and symptoms consistent with OA of back, L hip and L knee, resulting in limited participation in pain-free ADLs and inability to perform at her prior level of function. She is at a high falls risk. Pt would benefit from physical therapy to address the impairments found & listed previously in the objective section in order to reduce pain and improve safety and function with ADLs.       Rehab potential: fair    Plan:     Planned Interventions include: therapeutic exercise, self-care home management, manual therapy, therapeutic activities, gait training, neuromuscular coordination, aquatic therapy  Frequency: 1 x Week  Duration: 6 Weeks-8  amberly Brennan, PT

## 2024-06-30 DIAGNOSIS — F51.01 PRIMARY INSOMNIA: ICD-10-CM

## 2024-06-30 RX ORDER — ZOLPIDEM TARTRATE 10 MG/1
TABLET ORAL
Qty: 30 TABLET | Refills: 0 | Status: SHIPPED | OUTPATIENT
Start: 2024-06-30

## 2024-07-08 ENCOUNTER — OFFICE VISIT (OUTPATIENT)
Dept: PRIMARY CARE | Facility: CLINIC | Age: 89
End: 2024-07-08
Payer: MEDICARE

## 2024-07-08 VITALS
RESPIRATION RATE: 12 BRPM | DIASTOLIC BLOOD PRESSURE: 77 MMHG | OXYGEN SATURATION: 97 % | SYSTOLIC BLOOD PRESSURE: 134 MMHG | HEART RATE: 84 BPM

## 2024-07-08 DIAGNOSIS — M47.818 ARTHROPATHY OF LEFT SACROILIAC JOINT: ICD-10-CM

## 2024-07-08 DIAGNOSIS — I10 BENIGN ESSENTIAL HYPERTENSION: Primary | ICD-10-CM

## 2024-07-08 PROCEDURE — 3078F DIAST BP <80 MM HG: CPT | Performed by: INTERNAL MEDICINE

## 2024-07-08 PROCEDURE — 99213 OFFICE O/P EST LOW 20 MIN: CPT | Performed by: INTERNAL MEDICINE

## 2024-07-08 PROCEDURE — 3075F SYST BP GE 130 - 139MM HG: CPT | Performed by: INTERNAL MEDICINE

## 2024-07-08 NOTE — PROGRESS NOTES
Subjective   Patient ID: Pastora Dowling is a 89 y.o. female who presents for No chief complaint on file..    HPI   Sick visit same day after hours no staff no chest pain no shortness of breath no side effect with medication some lower back discomfort more towards the left side not necessarily the hip some abdominal concerns mainly gas bloating and cramping not particularly associated with any food  Review of Systems    Objective   There were no vitals taken for this visit.    Physical Exam vital signs noted alert and oriented x 3 NCAT no JVD or bruit chest clear to auscultation CV regular rate and rhythm S1-S2 without murmur gallop or rub no skip abdomen soft nontender nondistended normal active bowel sounds no rebound or guarding no HSM LS spine normal curvature negative straight leg raise negative logroll slightly positive left SI joint tenderness no hip bursal tenderness on the left able to arise from chair normal gait    Assessment/Plan impression hypertension sacroiliitis left abdominal gas bloating spasm  Plan continue with regular blood pressure medication watching diet okay for dicyclomine on an as-needed basis no longer uses the Paxil tylenol and heating pad PT if not better sleep hygiene ok for ambien use recheck 1-3 months, or sooner as needed

## 2024-07-10 ENCOUNTER — TREATMENT (OUTPATIENT)
Dept: PHYSICAL THERAPY | Facility: CLINIC | Age: 89
End: 2024-07-10
Payer: MEDICARE

## 2024-07-10 DIAGNOSIS — M54.50 LOW BACK PAIN WITH RADIATION: ICD-10-CM

## 2024-07-10 DIAGNOSIS — M19.90 OSTEOARTHRITIS, UNSPECIFIED OSTEOARTHRITIS TYPE, UNSPECIFIED SITE: ICD-10-CM

## 2024-07-10 DIAGNOSIS — G89.29 CHRONIC PAIN OF LEFT KNEE: Primary | ICD-10-CM

## 2024-07-10 DIAGNOSIS — M25.562 CHRONIC PAIN OF LEFT KNEE: Primary | ICD-10-CM

## 2024-07-10 PROCEDURE — 97110 THERAPEUTIC EXERCISES: CPT | Mod: GP

## 2024-07-10 ASSESSMENT — PAIN SCALES - GENERAL: PAINLEVEL_OUTOF10: 7

## 2024-07-10 ASSESSMENT — PAIN - FUNCTIONAL ASSESSMENT: PAIN_FUNCTIONAL_ASSESSMENT: 0-10

## 2024-07-10 NOTE — PROGRESS NOTES
"  Physical Therapy  Physical Therapy Treatment Note    Patient Name: Pastora Dowling  MRN: [id number]  Today's Date: 7/10/2024  Time Calculation  Start Time: 1231  Stop Time: 1313  Time Calculation (min): 42 min    Insurance:  Visit number: 2 of [place]  Authorization info: No Auth  Insurance Type: Medicare and [place]  Medicare Certification Period: Beginnin24 Endin2024  Onset date: 3/6522618     General:  Reason for visit: OA  Referred by: Gwyn Turcios MD     Current Problem  1. Chronic pain of left knee        2. Osteoarthritis, unspecified osteoarthritis type, unspecified site        3. Low back pain with radiation              Precautions: STEADI Fall Risk Score (The score of 4 or more indicates an increased risk of falling): 11         Subjective:     Patient reports that she has been doing her exercises diligently.  Feels that the L knee is more sore.  Missed last appointment because her sister passed away.     Pain  Pain Assessment: 0-10  0-10 (Numeric) Pain Score: 7    Performing HEP?: Yes      Objective:   Posture: Genu valgus L >R, FH     Gait: Ambulates with standard cane, reduced speed and stride length     Palpation: Tender to L knee medial joint line     Knee PROM (Degrees)                                         (R)                    (L)  Flexion:                        138                  136                                 Extension:                    -3                    -3      Treatment Performed:    Therapeutic Exercise:    42 min  R. Stepper L1 5'  Standing heel raises 2x10  Standing knee flexion R/L 2x10  Standing hip abduction R/L 2x10  Standing hip extension R/L 2x10  SLRs 2x10 R/L  Adductor squeeze 5\" hold 2x10  Supine hip abd/ER with green tband 2x10  Updated HEP.  Issued green Tband loop      Personalized Home Exercise Program:  Access Code: [email or web address]  URL: [email or web address]  Date: 07/10/2024  Prepared by: Dayana Brennan    Exercises  - " Supine Quadricep Sets  - 1 x daily - 7 x weekly - 15 reps - 5 seconds hold  - Small Range Straight Leg Raise  - 1 x daily - 7 x weekly - 2 sets - 10 reps  - Supine Heel Slide  - 1 x daily - 7 x weekly - 20 reps  - Beginner Bridge  - 1 x daily - 7 x weekly - 2 sets - 10 reps - 5 second hold  - Supine Hip Adduction Isometric with Ball  - 1 x daily - 7 x weekly - 2 sets - 10 reps - 5 seconds hold  - Hooklying Isometric Clamshell  - 1 x daily - 7 x weekly - 2 sets - 10 reps  - Hooklying Single Knee to Chest  - 1 x daily - 7 x weekly - 3 reps - 30 seconds hold  - Heel Raises with Counter Support  - 1 x daily - 7 x weekly - 2 sets - 10 reps      Assessment:   Big improvement in left knee ROM as compared to the initial evaluation.  At the end of the session, patient noted that she had more ease with sit-stand and notably less discomfort in her left knee.       Plan:  Continue to increase ROM, strength, motor control, flexibility, and balance control to reduce pain and improve function. Currently has 2 more appointments scheduled and is on the wait list for the weeks of 7/29/24 and 8/5/24.      Dayana Brennan, PT

## 2024-07-15 ENCOUNTER — TELEPHONE (OUTPATIENT)
Dept: PRIMARY CARE | Facility: CLINIC | Age: 89
End: 2024-07-15

## 2024-07-15 DIAGNOSIS — I10 BENIGN ESSENTIAL HYPERTENSION: ICD-10-CM

## 2024-07-15 DIAGNOSIS — Z00.00 HEALTH CARE MAINTENANCE: ICD-10-CM

## 2024-07-15 DIAGNOSIS — F51.01 PRIMARY INSOMNIA: ICD-10-CM

## 2024-07-15 RX ORDER — IPRATROPIUM BROMIDE 42 UG/1
2 SPRAY, METERED NASAL 4 TIMES DAILY
Qty: 15 ML | Refills: 1 | Status: SHIPPED | OUTPATIENT
Start: 2024-07-15

## 2024-07-15 RX ORDER — AMLODIPINE BESYLATE 5 MG/1
5 TABLET ORAL NIGHTLY
Qty: 30 TABLET | Refills: 2 | Status: SHIPPED | OUTPATIENT
Start: 2024-07-15 | End: 2024-08-14 | Stop reason: SDUPTHER

## 2024-07-18 ENCOUNTER — DOCUMENTATION (OUTPATIENT)
Dept: PHYSICAL THERAPY | Facility: CLINIC | Age: 89
End: 2024-07-18
Payer: COMMERCIAL

## 2024-07-18 ENCOUNTER — APPOINTMENT (OUTPATIENT)
Dept: PHYSICAL THERAPY | Facility: CLINIC | Age: 89
End: 2024-07-18
Payer: MEDICARE

## 2024-07-18 NOTE — PROGRESS NOTES
Physical Therapy                 Therapy Communication Note    Patient Name: Pastora Dowling  MRN: [id number]  Today's Date: 7/18/2024     Discipline: Physical Therapy    Missed Visit Reason:  Patient called noting feeling ill and will not be in today.    Missed Time: Cancel    Comment:

## 2024-07-25 ENCOUNTER — TREATMENT (OUTPATIENT)
Dept: PHYSICAL THERAPY | Facility: CLINIC | Age: 89
End: 2024-07-25
Payer: MEDICARE

## 2024-07-25 DIAGNOSIS — M54.50 LOW BACK PAIN WITH RADIATION: ICD-10-CM

## 2024-07-25 DIAGNOSIS — M25.562 CHRONIC PAIN OF LEFT KNEE: Primary | ICD-10-CM

## 2024-07-25 DIAGNOSIS — G89.29 CHRONIC PAIN OF LEFT KNEE: Primary | ICD-10-CM

## 2024-07-25 PROCEDURE — 97110 THERAPEUTIC EXERCISES: CPT | Mod: GP,CQ | Performed by: SPECIALIST/TECHNOLOGIST

## 2024-07-25 PROCEDURE — 97112 NEUROMUSCULAR REEDUCATION: CPT | Mod: GP,CQ | Performed by: SPECIALIST/TECHNOLOGIST

## 2024-07-25 ASSESSMENT — PAIN - FUNCTIONAL ASSESSMENT: PAIN_FUNCTIONAL_ASSESSMENT: 0-10

## 2024-07-25 NOTE — PROGRESS NOTES
"  Physical Therapy  Physical Therapy Treatment Note    Patient Name: Pastora Dowling  MRN: [id number]  Today's Date: 2024  Time Calculation  Start Time: 1300  Stop Time: 1345  Time Calculation (min): 45 min    Insurance:  Visit number: 3 of [place]  Authorization info: No Auth  Insurance Type: Medicare and [place]  Medicare Certification Period: Beginnin24 Endin2024  Onset date: 3/9082887     General:  Reason for visit: OA  Referred by: Gwyn Turcios MD     Current Problem  1. Chronic pain of left knee        2. Low back pain with radiation            Precautions: STEADI Fall Risk Score (The score of 4 or more indicates an increased risk of falling): 11       Subjective:     Patient arrived full weight bearing using a cane. Patient notes pain in L hip knee and leg with lower leg being the most painful today.      Pain  Pain Assessment: 0-10    Performing HEP?: Yes      Objective:   Posture: Genu valgus L >R, FH     Gait: Ambulates with standard cane, reduced speed and stride length     Palpation: Tender to L knee medial joint line     Knee PROM (Degrees)                                         (R)                    (L)  Flexion:                        138                  136                                 Extension:                    -3                    -3      Treatment Performed:    Therapeutic Exercise:    30 min  R. Stepper L1 5'  Standing 1# heel raises 2x15  Standing 1# knee flexion R/L x15  Standing 1# hip abduction R/L x15  Standing 1# hip extension R/L x15  SS R/L HF 1'   SS R/L Pirif 1'   SS R/L Quad 1'   SS R/L Hams 1'     NME 15 min   Airex MIP 2'   Airex narrow base R/L 1'   6\" hurdles (4) step over 3 laps, side step 3 laps          Personalized Home Exercise Program:  Access Code: [email or web address]      Assessment:   Patient tolerated intensity feeling good effort without increased symptoms.        Plan:  Continue to increase ROM, strength, motor control, flexibility, and balance control " to reduce pain and improve function.  Patient to schedule 3 further sessions then re-check with Alessandra Brennan PT, AT      Estiven Sadler PTA

## 2024-07-30 ENCOUNTER — TELEPHONE (OUTPATIENT)
Dept: PRIMARY CARE | Facility: CLINIC | Age: 89
End: 2024-07-30

## 2024-07-30 DIAGNOSIS — Z00.00 HEALTH CARE MAINTENANCE: ICD-10-CM

## 2024-07-31 RX ORDER — ZOLPIDEM TARTRATE 10 MG/1
TABLET ORAL
Qty: 30 TABLET | Refills: 0 | Status: SHIPPED | OUTPATIENT
Start: 2024-07-31

## 2024-08-05 ENCOUNTER — APPOINTMENT (OUTPATIENT)
Dept: PRIMARY CARE | Facility: CLINIC | Age: 89
End: 2024-08-05
Payer: MEDICARE

## 2024-08-05 VITALS — DIASTOLIC BLOOD PRESSURE: 76 MMHG | SYSTOLIC BLOOD PRESSURE: 126 MMHG

## 2024-08-05 DIAGNOSIS — I10 BENIGN ESSENTIAL HYPERTENSION: Primary | ICD-10-CM

## 2024-08-05 DIAGNOSIS — Z00.00 HEALTH CARE MAINTENANCE: ICD-10-CM

## 2024-08-05 DIAGNOSIS — G47.00 INSOMNIA, UNSPECIFIED TYPE: ICD-10-CM

## 2024-08-05 DIAGNOSIS — K21.9 GASTROESOPHAGEAL REFLUX DISEASE WITHOUT ESOPHAGITIS: ICD-10-CM

## 2024-08-05 PROCEDURE — 3078F DIAST BP <80 MM HG: CPT | Performed by: INTERNAL MEDICINE

## 2024-08-05 PROCEDURE — 3074F SYST BP LT 130 MM HG: CPT | Performed by: INTERNAL MEDICINE

## 2024-08-05 PROCEDURE — 99213 OFFICE O/P EST LOW 20 MIN: CPT | Performed by: INTERNAL MEDICINE

## 2024-08-05 RX ORDER — BUSPIRONE HYDROCHLORIDE 5 MG/1
5 TABLET ORAL DAILY PRN
Qty: 30 TABLET | Refills: 1 | Status: SHIPPED | OUTPATIENT
Start: 2024-08-05 | End: 2024-10-04

## 2024-08-05 RX ORDER — LEVOTHYROXINE SODIUM 100 UG/1
100 TABLET ORAL DAILY
Qty: 90 TABLET | Refills: 1 | Status: SHIPPED | OUTPATIENT
Start: 2024-08-05

## 2024-08-05 NOTE — PROGRESS NOTES
Subjective   Patient ID: Pastora Dowling is a 89 y.o. female who presents for No chief complaint on file..    HPI follow-up visit no chest pain no shortness of breath has been having some heartburn no belching or flatulence appetite has been excellent bowel movements have been normal no side effect with medication risk-benefit side effect of her medication reviewed with her and wishes to proceed but also like some anxiety medication    Review of Systems    Objective   There were no vitals taken for this visit.    Physical Exam vital signs noted alert and oriented x 3 NCAT no lid lag no JVD or bruit no thyromegaly chest clear to auscultation cor regular rate and rhythm S1-S2 without murmur gallop or rub abdomen soft nontender normal active bowel sounds LS spine normal curvature negative straight leg raise extremities no clubbing cyanosis or edema normal distal pulses DTR 2+    Assessment/Plan    impression hypertension insomnia GERD other diagnoses  Plan refill levothyroxine see EMR okay for buspirone 5 mg p.o. as needed discussed with risk benefits and side effects of medication and wishes to proceed when needed for ambien see EMR sleep hygiene good overall diet regular exercise Tylenol as needed for the joints especially in the low back and pelvis try to avoid NSAIDs okay for PPI she has at home and may take try not to eat late at night continue with htn treatment and management then recheck for regular visit End[facility] if no better

## 2024-08-14 DIAGNOSIS — I10 BENIGN ESSENTIAL HYPERTENSION: ICD-10-CM

## 2024-08-15 RX ORDER — AMLODIPINE BESYLATE 5 MG/1
5 TABLET ORAL NIGHTLY
Qty: 30 TABLET | Refills: 2 | Status: SHIPPED | OUTPATIENT
Start: 2024-08-15

## 2024-08-19 ENCOUNTER — APPOINTMENT (OUTPATIENT)
Dept: PRIMARY CARE | Facility: CLINIC | Age: 89
End: 2024-08-19
Payer: MEDICARE

## 2024-08-19 ENCOUNTER — APPOINTMENT (OUTPATIENT)
Dept: PHYSICAL THERAPY | Facility: CLINIC | Age: 89
End: 2024-08-19
Payer: COMMERCIAL

## 2024-08-19 VITALS
SYSTOLIC BLOOD PRESSURE: 134 MMHG | DIASTOLIC BLOOD PRESSURE: 76 MMHG | RESPIRATION RATE: 12 BRPM | HEART RATE: 68 BPM | OXYGEN SATURATION: 98 %

## 2024-08-19 DIAGNOSIS — H61.21 IMPACTED CERUMEN OF RIGHT EAR: ICD-10-CM

## 2024-08-19 DIAGNOSIS — K21.9 GASTROESOPHAGEAL REFLUX DISEASE WITHOUT ESOPHAGITIS: ICD-10-CM

## 2024-08-19 DIAGNOSIS — E03.9 HYPOTHYROIDISM, UNSPECIFIED TYPE: ICD-10-CM

## 2024-08-19 DIAGNOSIS — Z79.899 MEDICATION MANAGEMENT: Primary | ICD-10-CM

## 2024-08-19 DIAGNOSIS — G47.00 INSOMNIA, UNSPECIFIED TYPE: ICD-10-CM

## 2024-08-19 DIAGNOSIS — I10 BENIGN ESSENTIAL HYPERTENSION: ICD-10-CM

## 2024-08-19 PROCEDURE — 3075F SYST BP GE 130 - 139MM HG: CPT | Performed by: INTERNAL MEDICINE

## 2024-08-19 PROCEDURE — 69210 REMOVE IMPACTED EAR WAX UNI: CPT | Performed by: INTERNAL MEDICINE

## 2024-08-19 PROCEDURE — 99213 OFFICE O/P EST LOW 20 MIN: CPT | Performed by: INTERNAL MEDICINE

## 2024-08-19 PROCEDURE — 3078F DIAST BP <80 MM HG: CPT | Performed by: INTERNAL MEDICINE

## 2024-08-19 NOTE — PROGRESS NOTES
Subjective   Patient ID: Pastora Dowling is a 89 y.o. female who presents for No chief complaint on file..    HPI follow-up visit no chest pain no shortness of breath no side effect with medication her burning into her stomach is gone after taking the antacid medication she hopes to continue sleeps okay with the Ambien risk-benefit side effect reviewed with her and wishes to proceed having difficulty with her hearing aid on the right    Review of Systems    Objective   There were no vitals taken for this visit.    Physical Exam vital signs noted alert and oriented x 3 NCAT no coryza no lid lag nares without discharge OP benign TM EAC clear on the left on the right there is impacted cerumen hearing aid was removed    Procedure multiple loop removal of impacted cerumen TM opaque hearing aid reapplied now can hear no complications    No JVD or bruit cno thyromegaly hest clear to auscultation CV regular rate and rhythm S1-S2 with same murmur  no S3 abdomen soft nontender normal active bowel sounds extremities no clubbing cyanosis or edema normal distal pulses    Assessment/Plan impression hypertension hypothyroidism insomnia cerumen impaction GERD  Plan avoidance of NSAIDs okay for PPI or even H2 blocker at night May continue watch diet try not eating late at night continue with blood pressure medicine amlodipine she is taking 5 mg/day urine medication screen [facility] report medication agreement as needed for the next time that she has a refill of the Ambien sleep hygiene no medication is needed for the ears then recheck if no better and for regular visit cpe and blood work regarding thyroid while continuing with same levothyroxine

## 2024-08-26 ENCOUNTER — TREATMENT (OUTPATIENT)
Dept: PHYSICAL THERAPY | Facility: CLINIC | Age: 89
End: 2024-08-26
Payer: MEDICARE

## 2024-08-26 PROCEDURE — 97112 NEUROMUSCULAR REEDUCATION: CPT | Mod: GP,CQ | Performed by: SPECIALIST/TECHNOLOGIST

## 2024-08-26 PROCEDURE — 97110 THERAPEUTIC EXERCISES: CPT | Mod: GP,CQ | Performed by: SPECIALIST/TECHNOLOGIST

## 2024-08-26 ASSESSMENT — PAIN - FUNCTIONAL ASSESSMENT: PAIN_FUNCTIONAL_ASSESSMENT: 0-10

## 2024-08-26 NOTE — PROGRESS NOTES
"  Physical Therapy  Physical Therapy Treatment Note    Patient Name: Pastora Dowling  MRN: [id number]  Today's Date: 2024  Time Calculation  Start Time: 1200  Stop Time: 1245  Time Calculation (min): 45 min    Insurance:  Visit number: 4 of [place]  Authorization info: No Auth  Insurance Type: Medicare and [place]  Medicare Certification Period: Beginnin24 Endin2024  Onset date: 3/15/2024     General:  Reason for visit: OA  Referred by: Gwyn Turcios MD     Current Problem  1. Chronic pain of left knee              Precautions: DELFINOADI Fall Risk Score (The score of 4 or more indicates an increased risk of falling): 11       Subjective:     Patient arrived full weight bearing using a cane.  Patient noted feeling good after last session without soreness although is sore on arrival today.      Pain  Pain Assessment: 0-10    Performing HEP?: Yes      Objective:   Posture: Genu valgus L >R, FH     Gait: Ambulates with standard cane, reduced speed and stride length     Palpation: Tender to L knee medial joint line     Knee PROM (Degrees)                                         (R)                    (L)  Flexion:                        138                  136                                 Extension:                    -3                    -3      Treatment Performed:    Therapeutic Exercise:    30 min  R. Stepper L1 5'  Standing 2# heel raises 2x15  Standing 2# knee flexion R/L x15  Standing 2# hip abduction R/L x15  Standing 2# hip extension R/L x15  SS R/L HF 1'   SS R/L Pirif 1'   SS R/L Quad 1'   SS R/L Hams 1'     NME 15 min   Airex MIP 2'   Airex narrow base R/L 1'   6\" hurdles (4) step over 3 laps, side step 3 laps          Personalized Home Exercise Program:  Access Code: [email or web address]      Assessment:   Patient tolerated intensity noting mild awareness with calf stretching and SL work on L.  Patient noted no increased symptoms post treatment.  Patient only kicked 2 hurdles AP in 3 laps and was able " Bed mobility SBA. Toileted CGA. Gait trained 2x70 feet with RW CGA with verbal cues for RW safety.   Rec low intensity therapy   to step over correctly side stepping 3 laps. Increased to 2# cuff weight today without problems.       Plan:  Continue to increase ROM, strength, motor control, flexibility, and balance control to reduce pain and improve function.  Patient has 1 further session then re-check with Alessandra Brennan PT, AT      Estiven Sadler PTA

## 2024-08-27 DIAGNOSIS — F51.01 PRIMARY INSOMNIA: ICD-10-CM

## 2024-08-29 RX ORDER — ZOLPIDEM TARTRATE 10 MG/1
TABLET ORAL
Qty: 30 TABLET | Refills: 0 | Status: SHIPPED | OUTPATIENT
Start: 2024-08-29

## 2024-09-03 ENCOUNTER — TREATMENT (OUTPATIENT)
Dept: PHYSICAL THERAPY | Facility: CLINIC | Age: 89
End: 2024-09-03
Payer: MEDICARE

## 2024-09-03 DIAGNOSIS — M25.562 CHRONIC PAIN OF LEFT KNEE: Primary | ICD-10-CM

## 2024-09-03 DIAGNOSIS — G89.29 CHRONIC PAIN OF LEFT KNEE: Primary | ICD-10-CM

## 2024-09-03 PROCEDURE — 97112 NEUROMUSCULAR REEDUCATION: CPT | Mod: GP,CQ | Performed by: SPECIALIST/TECHNOLOGIST

## 2024-09-03 PROCEDURE — 97110 THERAPEUTIC EXERCISES: CPT | Mod: GP,CQ | Performed by: SPECIALIST/TECHNOLOGIST

## 2024-09-03 ASSESSMENT — PAIN SCALES - GENERAL: PAINLEVEL_OUTOF10: 7

## 2024-09-03 ASSESSMENT — PAIN - FUNCTIONAL ASSESSMENT: PAIN_FUNCTIONAL_ASSESSMENT: 0-10

## 2024-09-03 NOTE — PROGRESS NOTES
"  Physical Therapy  Physical Therapy Treatment Note    Patient Name: Pastora Dowling  MRN: [id number]  Today's Date: 9/3/2024  Time Calculation  Start Time: 1130  Stop Time: 1215  Time Calculation (min): 45 min    Insurance:  Visit number: 5 [facility]  Authorization info: No Auth  Insurance Type: Medicare and [place]  Medicare Certification Period: Beginnin24 Endin2024  Onset date: 3/15/2024     General:  Reason for visit: OA  Referred by: Gwyn Turcios MD     Current Problem  1. Chronic pain of left knee            Precautions: DELFINOADI Fall Risk Score (The score of 4 or more indicates an increased risk of falling): 11    Subjective:     Patient arrived full weight bearing using a cane.  Patient noted feeling sore for a day and a half after last session.  Patient notes L knee and leg pain on arrival today  10.  Patient arrived using a cane.      Pain  Pain Assessment: 0-10  0-10 (Numeric) Pain Score: 7    Performing HEP?: Yes      Objective:   Posture: Genu valgus L >R, FH     Gait: Ambulates with standard cane, reduced speed and stride length     Palpation: Tender to L knee medial joint line     Knee PROM (Degrees)                                         (R)                    (L)  Flexion:                        138                  136                                 Extension:                    -3                    -3      Treatment Performed:    Therapeutic Exercise:    30 min  R. Stepper L1 5'  Standing 2# heel raises 2x15  Standing 2# knee flexion R/L x15  Standing 2# hip abduction R/L x15  Standing 2# hip extension R/L x15  SS R/L HF 1'   SS R/L Pirif 1'   SS R/L Quad 1'   SS R/L Hams 1'     NME 15 min   Airex MIP 2'   Airex narrow base R/L 1'   6\" hurdles (4) step over 3 laps, side step 3 laps          Personalized Home Exercise Program:  Access Code: [email or web address]      Assessment:   Patient tolerated intensity noting mild awareness with calf stretching and SL work on L.  Patient noted no " increased symptoms post treatment. Patient noted L ankle sore with 2# work.    Plan:  Continue to increase ROM, strength, motor control, flexibility, and balance control to reduce pain and improve function.  Patient has re-check with Alessandra Brennan PT, AT next session on 9/11/2024.      Estiven Sadler, PTA

## 2024-09-11 ENCOUNTER — DOCUMENTATION (OUTPATIENT)
Dept: PHYSICAL THERAPY | Facility: CLINIC | Age: 89
End: 2024-09-11
Payer: COMMERCIAL

## 2024-09-11 ENCOUNTER — APPOINTMENT (OUTPATIENT)
Dept: PHYSICAL THERAPY | Facility: CLINIC | Age: 89
End: 2024-09-11
Payer: MEDICARE

## 2024-09-11 NOTE — PROGRESS NOTES
Therapy Communication Note    Patient Name: Pastora Dowling  MRN: [id number]  Today's Date: 9/11/2024     Discipline: Physical Therapy    Missed Visit Reason:  None provided    Missed Type: Cancel    Comment:

## 2024-09-18 ENCOUNTER — APPOINTMENT (OUTPATIENT)
Dept: PRIMARY CARE | Facility: CLINIC | Age: 89
End: 2024-09-18
Payer: MEDICARE

## 2024-09-18 VITALS — SYSTOLIC BLOOD PRESSURE: 124 MMHG | HEART RATE: 66 BPM | RESPIRATION RATE: 12 BRPM | DIASTOLIC BLOOD PRESSURE: 76 MMHG

## 2024-09-18 DIAGNOSIS — F51.01 PRIMARY INSOMNIA: ICD-10-CM

## 2024-09-18 DIAGNOSIS — F41.9 ANXIETY: ICD-10-CM

## 2024-09-18 DIAGNOSIS — Z00.00 HEALTH CARE MAINTENANCE: Primary | ICD-10-CM

## 2024-09-18 DIAGNOSIS — I10 BENIGN ESSENTIAL HYPERTENSION: ICD-10-CM

## 2024-09-18 PROCEDURE — 3074F SYST BP LT 130 MM HG: CPT | Performed by: INTERNAL MEDICINE

## 2024-09-18 PROCEDURE — 3078F DIAST BP <80 MM HG: CPT | Performed by: INTERNAL MEDICINE

## 2024-09-18 PROCEDURE — 99213 OFFICE O/P EST LOW 20 MIN: CPT | Performed by: INTERNAL MEDICINE

## 2024-09-18 NOTE — PROGRESS NOTES
Subjective   Patient ID: Pastora Dowling is a 89 y.o. female who presents for No chief complaint on file..    HPI sick visit no chest pain no shortness of breath no side effect with medication has been anxious and sleeping not as well as before no side effect with medication concerned that the Ambien is not working as well as it had been in the past  Review of Systems    Objective   There were no vitals taken for this visit.    Physical Exam vs noted alert and oriented x 3 NCAT no JVD chest clear to auscultation cor regular rate and rhythm S1-S2 without murmur gallop or rub extremities no clubbing cyanosis or edema normal distal pulses    Assessment/Plan impression insomnia anxiety hypertension  Plan she is worried because she had it occur in her driveway although no physical damage or symptom discussed with nonpharmacological methods for anxiety relief she never did take the Paxil in the past but may start at this time 10 mg p.o. daily is the Ambien at night risk-benefit and effects noted with her and wishes to proceed has supply at home sleep hygiene good water consumption continue with cardiovascular medications and recheck if no better and for regular visit

## 2024-09-26 DIAGNOSIS — F51.01 PRIMARY INSOMNIA: ICD-10-CM

## 2024-09-28 RX ORDER — ZOLPIDEM TARTRATE 10 MG/1
TABLET ORAL
Qty: 30 TABLET | Refills: 0 | OUTPATIENT
Start: 2024-09-28

## 2024-09-30 ENCOUNTER — TELEPHONE (OUTPATIENT)
Dept: PRIMARY CARE | Facility: CLINIC | Age: 89
End: 2024-09-30

## 2024-09-30 ENCOUNTER — LAB (OUTPATIENT)
Dept: LAB | Facility: LAB | Age: 89
End: 2024-09-30
Payer: COMMERCIAL

## 2024-09-30 DIAGNOSIS — Z79.899 MEDICATION MANAGEMENT: ICD-10-CM

## 2024-09-30 LAB
AMPHETAMINES UR QL SCN: NORMAL
BARBITURATES UR QL SCN: NORMAL
BENZODIAZ UR QL SCN: NORMAL
BZE UR QL SCN: NORMAL
CANNABINOIDS UR QL SCN: NORMAL
FENTANYL+NORFENTANYL UR QL SCN: NORMAL
METHADONE UR QL SCN: NORMAL
OPIATES UR QL SCN: NORMAL
OXYCODONE+OXYMORPHONE UR QL SCN: NORMAL
PCP UR QL SCN: NORMAL

## 2024-09-30 PROCEDURE — 80307 DRUG TEST PRSMV CHEM ANLYZR: CPT

## 2024-10-16 ENCOUNTER — OFFICE VISIT (OUTPATIENT)
Dept: PRIMARY CARE | Facility: CLINIC | Age: 89
End: 2024-10-16
Payer: MEDICARE

## 2024-10-16 VITALS — SYSTOLIC BLOOD PRESSURE: 134 MMHG | DIASTOLIC BLOOD PRESSURE: 78 MMHG

## 2024-10-16 DIAGNOSIS — K58.8 OTHER IRRITABLE BOWEL SYNDROME: ICD-10-CM

## 2024-10-16 DIAGNOSIS — Z00.00 HEALTH CARE MAINTENANCE: Primary | ICD-10-CM

## 2024-10-16 DIAGNOSIS — I10 BENIGN ESSENTIAL HYPERTENSION: ICD-10-CM

## 2024-10-16 DIAGNOSIS — M54.50 LOW BACK PAIN WITH RADIATION: ICD-10-CM

## 2024-10-16 PROCEDURE — 3075F SYST BP GE 130 - 139MM HG: CPT | Performed by: INTERNAL MEDICINE

## 2024-10-16 PROCEDURE — 3078F DIAST BP <80 MM HG: CPT | Performed by: INTERNAL MEDICINE

## 2024-10-16 PROCEDURE — 99213 OFFICE O/P EST LOW 20 MIN: CPT | Performed by: INTERNAL MEDICINE

## 2024-10-16 RX ORDER — ACYCLOVIR 400 MG/1
400 TABLET ORAL 3 TIMES DAILY
Qty: 15 TABLET | Refills: 0 | Status: SHIPPED | OUTPATIENT
Start: 2024-10-16 | End: 2024-10-21

## 2024-10-16 NOTE — PROGRESS NOTES
Subjective   Patient ID: Pastora Dowling is a 89 y.o. female who presents for No chief complaint on file..    HPI follow-up visit she has not been feeling well mainly with her stomach she also has a sore mouth has been rinsing with peroxide 3 times daily no fever no chest pain no shortness of breath bowels themselves have been okay anxious not sleeping    Review of Systems    Objective   There were no vitals taken for this visit.    Physical Exam vital signs noted alert and oriented x 3 NCAT no JVD or bruit chest clear to auscultation CV regular rate and rhythm S1-S2 abdomen soft nontender normal active bowel sounds LS spine normal curvature negative straight leg raise extremities no clubbing cyanosis or edema normal distal pulses DTR 1+    Assessment/Plan    impression hypertension?  IBS low back pain psoriatic arthritis regular arthritis  Plan she has some oral mucositis she has been gargling with peroxide no discrete lesions but some white bumps she has upcoming appointment with the dental medicine in the meantime may use prescription acyclovir 400 mg 3 times daily x 5 days #15 refill 0 see EMR for her sleep okay for the Ambien take as early in the evening as possible she also has trazodone did not use that on the same night sleep hygiene she has BuSpar may use 5 mg as needed anxiety otherwise use the SSRI on a regular basis first thing in the morning okay for dicyclomine on an as-needed basis for stomach she just began the Prilosec 20 mg p.o. daily continue with cardiovascular medicines Tylenol as needed for arthritis meloxicam did not help so discontinue then recheck if no better and based on above

## 2024-10-21 ENCOUNTER — TELEPHONE (OUTPATIENT)
Dept: PRIMARY CARE | Facility: CLINIC | Age: 89
End: 2024-10-21

## 2024-10-21 DIAGNOSIS — Z00.00 HEALTH CARE MAINTENANCE: ICD-10-CM

## 2024-10-21 DIAGNOSIS — F51.01 PRIMARY INSOMNIA: ICD-10-CM

## 2024-10-26 RX ORDER — ZOLPIDEM TARTRATE 10 MG/1
TABLET ORAL
Qty: 30 TABLET | Refills: 0 | Status: SHIPPED | OUTPATIENT
Start: 2024-10-26

## 2024-10-29 RX ORDER — LEVOTHYROXINE SODIUM 100 UG/1
100 TABLET ORAL DAILY
Qty: 90 TABLET | Refills: 1 | Status: SHIPPED | OUTPATIENT
Start: 2024-10-29 | End: 2024-11-05 | Stop reason: SDUPTHER

## 2024-11-05 DIAGNOSIS — Z00.00 HEALTH CARE MAINTENANCE: ICD-10-CM

## 2024-11-05 RX ORDER — LEVOTHYROXINE SODIUM 100 UG/1
100 TABLET ORAL DAILY
Qty: 90 TABLET | Refills: 1 | Status: SHIPPED | OUTPATIENT
Start: 2024-11-05

## 2024-11-12 ENCOUNTER — APPOINTMENT (OUTPATIENT)
Dept: GYNECOLOGIC ONCOLOGY | Facility: CLINIC | Age: 89
End: 2024-11-12
Payer: COMMERCIAL

## 2024-11-20 NOTE — PROGRESS NOTES
Patient ID: Pastora Dowling is a 89 y.o. female.  Referring Physician: No referring provider defined for this encounter.  Primary Care Provider: Jerome Woodward MD    Subjective    Interval History:  Pastora is overall doing well, she notes that she's turning 89 this year and her partner's turning 89. No concerns other than the pre-cancer coming back.    She denies fever, chills, chest pain, SOB, nausea, vomiting, diarrhea, constipation, dysuria, or any other concerning signs of symptoms.          Past Medical History: arthritis, HTN, hearing loss, breast cancer on tamoxifen, hypothyroidism, HLD     Past Surgical History: partial thyroidectomy, ablation, left breast lumpectomy     Family History: No history of ovarian/fallopian tube, endometrial, breast, pancreas, or GI cancers.      Social History: smoking: denies, alcohol use: occasional, other drug use: none    Objective    BSA: 1.63 meters squared  /75   Pulse 73   Temp 36.2 °C (97.2 °F)   Resp 18   Wt 58.5 kg (128 lb 15.5 oz)   SpO2 94%   BMI 22.14 kg/m²      Physical Exam  Constitutional:       Appearance: Normal appearance. She is normal weight.   HENT:      Head: Normocephalic.      Mouth/Throat:      Mouth: Mucous membranes are moist.   Eyes:      Pupils: Pupils are equal, round, and reactive to light.   Cardiovascular:      Rate and Rhythm: Normal rate and regular rhythm.      Heart sounds: No murmur heard.     No friction rub. No gallop.   Pulmonary:      Effort: Pulmonary effort is normal.      Breath sounds: Normal breath sounds.   Abdominal:      General: Abdomen is flat. Bowel sounds are normal.      Palpations: Abdomen is soft.   Genitourinary:     Comments: Normal external female genitalia without lesions or masses  Speculum exam: Acetic acid applied. Smooth vagina without lesions or masses surgically absent cervix, vaginal canal is shortened from prior colpocleisis 3cm in length  Bimanual exam: smooth vagina without lesions or  masses      Musculoskeletal:         General: No swelling.   Skin:     General: Skin is warm and dry.   Neurological:      Mental Status: She is alert.         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 ECOG performance status: 1. Comorbidities include: arthritis, HTN, hearing loss, breast cancer on tamoxifen.     # VAIN  - Natural history of VAIN discussed with patient in detail. Discussed VaIN as a spectrum of disease ranging from low grade to high grade lesions. Pathology c/w with low-grade lesion with very low chance of progression of malignancy, especially in light of excision during colpocleisis. Nevertheless, recommend close surveillance although recognize this will be limited due to colpocleisis.      Normal exam, no visible lesions. RTC 6 months      Scribe Attestation  By signing my name below, I, Christ Rose   attest that this documentation has been prepared under the direction and in the presence of Norma Lord MD.     Provider Attestation - Scribe documentation    All medical record entries made by the Scribe were at my direction and personally dictated by me. I have reviewed the chart and agree that the record accurately reflects my personal performance of the history, physical exam, discussion and plan.    Norma Lord MD

## 2024-11-21 ENCOUNTER — OFFICE VISIT (OUTPATIENT)
Dept: GYNECOLOGIC ONCOLOGY | Facility: CLINIC | Age: 89
End: 2024-11-21
Payer: MEDICARE

## 2024-11-21 VITALS
HEART RATE: 73 BPM | BODY MASS INDEX: 22.14 KG/M2 | WEIGHT: 128.97 LBS | TEMPERATURE: 97.2 F | DIASTOLIC BLOOD PRESSURE: 75 MMHG | RESPIRATION RATE: 18 BRPM | SYSTOLIC BLOOD PRESSURE: 150 MMHG | OXYGEN SATURATION: 94 %

## 2024-11-21 DIAGNOSIS — N89.0 VAIN I (VAGINAL INTRAEPITHELIAL NEOPLASIA GRADE I): Primary | ICD-10-CM

## 2024-11-21 PROCEDURE — 99417 PROLNG OP E/M EACH 15 MIN: CPT | Performed by: STUDENT IN AN ORGANIZED HEALTH CARE EDUCATION/TRAINING PROGRAM

## 2024-11-21 PROCEDURE — 1126F AMNT PAIN NOTED NONE PRSNT: CPT | Performed by: STUDENT IN AN ORGANIZED HEALTH CARE EDUCATION/TRAINING PROGRAM

## 2024-11-21 PROCEDURE — 99214 OFFICE O/P EST MOD 30 MIN: CPT | Performed by: STUDENT IN AN ORGANIZED HEALTH CARE EDUCATION/TRAINING PROGRAM

## 2024-11-21 PROCEDURE — 3077F SYST BP >= 140 MM HG: CPT | Performed by: STUDENT IN AN ORGANIZED HEALTH CARE EDUCATION/TRAINING PROGRAM

## 2024-11-21 PROCEDURE — 3078F DIAST BP <80 MM HG: CPT | Performed by: STUDENT IN AN ORGANIZED HEALTH CARE EDUCATION/TRAINING PROGRAM

## 2024-11-21 PROCEDURE — 1159F MED LIST DOCD IN RCRD: CPT | Performed by: STUDENT IN AN ORGANIZED HEALTH CARE EDUCATION/TRAINING PROGRAM

## 2024-11-21 PROCEDURE — 1160F RVW MEDS BY RX/DR IN RCRD: CPT | Performed by: STUDENT IN AN ORGANIZED HEALTH CARE EDUCATION/TRAINING PROGRAM

## 2024-11-21 ASSESSMENT — PAIN SCALES - GENERAL: PAINLEVEL_OUTOF10: 0-NO PAIN

## 2024-11-29 ENCOUNTER — TELEPHONE (OUTPATIENT)
Dept: PRIMARY CARE | Facility: CLINIC | Age: 89
End: 2024-11-29

## 2024-11-29 DIAGNOSIS — I10 BENIGN ESSENTIAL HYPERTENSION: ICD-10-CM

## 2024-11-29 DIAGNOSIS — F51.01 PRIMARY INSOMNIA: ICD-10-CM

## 2024-11-29 RX ORDER — ZOLPIDEM TARTRATE 10 MG/1
TABLET ORAL
Qty: 30 TABLET | Refills: 0 | Status: SHIPPED | OUTPATIENT
Start: 2024-11-29

## 2024-12-04 ENCOUNTER — APPOINTMENT (OUTPATIENT)
Dept: PRIMARY CARE | Facility: CLINIC | Age: 89
End: 2024-12-04
Payer: MEDICARE

## 2024-12-04 VITALS
RESPIRATION RATE: 12 BRPM | WEIGHT: 125 LBS | DIASTOLIC BLOOD PRESSURE: 76 MMHG | BODY MASS INDEX: 21.46 KG/M2 | HEART RATE: 66 BPM | SYSTOLIC BLOOD PRESSURE: 127 MMHG

## 2024-12-04 DIAGNOSIS — Z00.00 HEALTH CARE MAINTENANCE: Primary | ICD-10-CM

## 2024-12-04 DIAGNOSIS — E78.49 ESSENTIAL FAMILIAL HYPERLIPIDEMIA: ICD-10-CM

## 2024-12-04 DIAGNOSIS — I10 BENIGN ESSENTIAL HYPERTENSION: ICD-10-CM

## 2024-12-04 DIAGNOSIS — K58.8 OTHER IRRITABLE BOWEL SYNDROME: ICD-10-CM

## 2024-12-04 DIAGNOSIS — F51.01 PRIMARY INSOMNIA: ICD-10-CM

## 2024-12-04 DIAGNOSIS — E03.9 HYPOTHYROIDISM, UNSPECIFIED TYPE: ICD-10-CM

## 2024-12-04 PROCEDURE — G0439 PPPS, SUBSEQ VISIT: HCPCS | Performed by: INTERNAL MEDICINE

## 2024-12-04 PROCEDURE — 99213 OFFICE O/P EST LOW 20 MIN: CPT | Performed by: INTERNAL MEDICINE

## 2024-12-04 PROCEDURE — 1036F TOBACCO NON-USER: CPT | Performed by: INTERNAL MEDICINE

## 2024-12-04 PROCEDURE — 3078F DIAST BP <80 MM HG: CPT | Performed by: INTERNAL MEDICINE

## 2024-12-04 PROCEDURE — 1159F MED LIST DOCD IN RCRD: CPT | Performed by: INTERNAL MEDICINE

## 2024-12-04 PROCEDURE — 1170F FXNL STATUS ASSESSED: CPT | Performed by: INTERNAL MEDICINE

## 2024-12-04 PROCEDURE — 3074F SYST BP LT 130 MM HG: CPT | Performed by: INTERNAL MEDICINE

## 2024-12-04 PROCEDURE — 1160F RVW MEDS BY RX/DR IN RCRD: CPT | Performed by: INTERNAL MEDICINE

## 2024-12-04 RX ORDER — LEVOTHYROXINE SODIUM 100 UG/1
100 TABLET ORAL DAILY
Qty: 90 TABLET | Refills: 1 | Status: SHIPPED | OUTPATIENT
Start: 2024-12-04

## 2024-12-04 RX ORDER — IPRATROPIUM BROMIDE 42 UG/1
2 SPRAY, METERED NASAL 4 TIMES DAILY
Qty: 15 ML | Refills: 1 | Status: SHIPPED | OUTPATIENT
Start: 2024-12-04

## 2024-12-04 NOTE — PROGRESS NOTES
Subjective   Patient ID: Pastora Dowling is a 89 y.o. female who presents for No chief complaint on file..    HPI CPE septated Franchi no chest pain or shortness of breath no side effect with medication otherwise doing okay not sleeping well her back has been hurting may need a new mattress there has been no falls risk-benefit side effect of her medication reviewed with her and wishes to proceed nose has been running has come to the last of her levothyroxine medication bowels okay constipation and looser stools alternate doing okay with her bladder joints have been aching refill thyroid medication check TSH and advised on dosing    Past medical history hypertension hyperlipidemia hypothyroidism IBS psoriasis psoriatic arthritis    Medications noted and unchanged including Ambien    Allergies noted and unchanged see EMR    Social history no tobacco alcohol social wine    Family history noted and unchanged    Prevention may no longer be having mammogram and colonoscopy Limited exercise walks with cane some prior blood work reviewed    Depression not depressed    Review of Systems    Objective   There were no vitals taken for this visit.  Vital signs noted alert and oriented x 3 NCAT PERRLA EOMI nares without discharge OP benign TM normal bilateral opaque EAC clear bilateral minimal cerumen no AC nodes no JVD no lid lag no thyromegaly chest clear auscultation and percussion CV regular rate and rhythm S1-S2 without murmur gallop or rub abdomen soft nontender normal active bowel sounds LS spine normal curvature negative straight leg raise nontender spinous process extremities no clubbing cyanosis or edema normal distal pulses DTR 2+ musculoskeletal DJD changes and psoriatic changes of the hands skin multiple psoriatic changes chronic for over the trunk and arms and legs  Physical Exam    Assessment/Plan    impression General Medical examination psoriasis psoriatic arthritis hypertension hyperlipidemia hypothyroidism  insomnia allergic rhinitis low back pain  Plan check comprehensive panel visit glucose potassium and kidney function as well as liver function back hygiene back stretches heating pad may use meloxicam briefly twice per day and additional Tylenol as needed new mattress if getting formal physical therapy if needed Endor imaging check CBC advised on blood pressure check with analgiser cholesterol profile check TSH advised on thyroid follow-up with dermatology for the psoriasis then follow-up and recheck in 1 to 3 months based on above TT 60 cc 31

## 2024-12-12 RX ORDER — AMLODIPINE BESYLATE 5 MG/1
5 TABLET ORAL NIGHTLY
Qty: 30 TABLET | Refills: 2 | Status: SHIPPED | OUTPATIENT
Start: 2024-12-12

## 2024-12-15 ASSESSMENT — ACTIVITIES OF DAILY LIVING (ADL)
MANAGING_FINANCES: INDEPENDENT
BATHING: INDEPENDENT
DRESSING: INDEPENDENT
TAKING_MEDICATION: INDEPENDENT
DOING_HOUSEWORK: INDEPENDENT
GROCERY_SHOPPING: INDEPENDENT

## 2024-12-15 ASSESSMENT — ENCOUNTER SYMPTOMS
DEPRESSION: 0
LOSS OF SENSATION IN FEET: 0
OCCASIONAL FEELINGS OF UNSTEADINESS: 0

## 2024-12-15 ASSESSMENT — PATIENT HEALTH QUESTIONNAIRE - PHQ9
SUM OF ALL RESPONSES TO PHQ9 QUESTIONS 1 AND 2: 0
2. FEELING DOWN, DEPRESSED OR HOPELESS: NOT AT ALL
1. LITTLE INTEREST OR PLEASURE IN DOING THINGS: NOT AT ALL

## 2024-12-23 DIAGNOSIS — F51.01 PRIMARY INSOMNIA: ICD-10-CM

## 2024-12-28 DIAGNOSIS — F51.01 PRIMARY INSOMNIA: ICD-10-CM

## 2024-12-28 RX ORDER — ZOLPIDEM TARTRATE 10 MG/1
TABLET ORAL
Qty: 30 TABLET | Refills: 0 | Status: SHIPPED | OUTPATIENT
Start: 2024-12-28 | End: 2025-01-24 | Stop reason: SDUPTHER

## 2025-01-02 RX ORDER — ZOLPIDEM TARTRATE 10 MG/1
TABLET ORAL
Qty: 15 TABLET | OUTPATIENT
Start: 2025-01-02

## 2025-01-07 DIAGNOSIS — M19.90 OSTEOARTHRITIS, UNSPECIFIED OSTEOARTHRITIS TYPE, UNSPECIFIED SITE: ICD-10-CM

## 2025-01-09 RX ORDER — MELOXICAM 7.5 MG/1
7.5 TABLET ORAL DAILY PRN
Qty: 30 TABLET | Refills: 0 | Status: SHIPPED | OUTPATIENT
Start: 2025-01-09 | End: 2025-04-09

## 2025-01-13 DIAGNOSIS — M19.90 OSTEOARTHRITIS, UNSPECIFIED OSTEOARTHRITIS TYPE, UNSPECIFIED SITE: ICD-10-CM

## 2025-01-13 DIAGNOSIS — I10 BENIGN ESSENTIAL HYPERTENSION: ICD-10-CM

## 2025-01-15 RX ORDER — AMLODIPINE BESYLATE 5 MG/1
5 TABLET ORAL NIGHTLY
Qty: 30 TABLET | Refills: 2 | Status: SHIPPED | OUTPATIENT
Start: 2025-01-15

## 2025-01-15 RX ORDER — MELOXICAM 7.5 MG/1
7.5 TABLET ORAL DAILY PRN
Qty: 30 TABLET | Refills: 0 | Status: SHIPPED | OUTPATIENT
Start: 2025-01-15 | End: 2025-04-15

## 2025-01-24 DIAGNOSIS — F51.01 PRIMARY INSOMNIA: ICD-10-CM

## 2025-01-25 RX ORDER — ZOLPIDEM TARTRATE 10 MG/1
TABLET ORAL
Qty: 30 TABLET | Refills: 0 | Status: SHIPPED | OUTPATIENT
Start: 2025-01-25

## 2025-01-27 ENCOUNTER — TELEPHONE (OUTPATIENT)
Dept: PRIMARY CARE | Facility: CLINIC | Age: OVER 89
End: 2025-01-27

## 2025-01-27 DIAGNOSIS — I10 BENIGN ESSENTIAL HYPERTENSION: ICD-10-CM

## 2025-02-10 RX ORDER — AMLODIPINE BESYLATE 5 MG/1
5 TABLET ORAL NIGHTLY
Qty: 30 TABLET | Refills: 2 | Status: SHIPPED | OUTPATIENT
Start: 2025-02-10

## 2025-02-18 ENCOUNTER — APPOINTMENT (OUTPATIENT)
Dept: PRIMARY CARE | Facility: CLINIC | Age: OVER 89
End: 2025-02-18
Payer: MEDICARE

## 2025-02-19 ENCOUNTER — OFFICE VISIT (OUTPATIENT)
Dept: PRIMARY CARE | Facility: CLINIC | Age: OVER 89
End: 2025-02-19
Payer: MEDICARE

## 2025-02-19 VITALS — SYSTOLIC BLOOD PRESSURE: 123 MMHG | DIASTOLIC BLOOD PRESSURE: 73 MMHG

## 2025-02-19 DIAGNOSIS — K59.00 CONSTIPATION, UNSPECIFIED CONSTIPATION TYPE: Primary | ICD-10-CM

## 2025-02-19 DIAGNOSIS — I10 BENIGN ESSENTIAL HYPERTENSION: ICD-10-CM

## 2025-02-19 DIAGNOSIS — M25.511 RIGHT SHOULDER PAIN, UNSPECIFIED CHRONICITY: ICD-10-CM

## 2025-02-19 PROCEDURE — 3074F SYST BP LT 130 MM HG: CPT | Performed by: INTERNAL MEDICINE

## 2025-02-19 PROCEDURE — 99213 OFFICE O/P EST LOW 20 MIN: CPT | Performed by: INTERNAL MEDICINE

## 2025-02-19 PROCEDURE — G2211 COMPLEX E/M VISIT ADD ON: HCPCS | Performed by: INTERNAL MEDICINE

## 2025-02-19 PROCEDURE — 1159F MED LIST DOCD IN RCRD: CPT | Performed by: INTERNAL MEDICINE

## 2025-02-19 PROCEDURE — 3078F DIAST BP <80 MM HG: CPT | Performed by: INTERNAL MEDICINE

## 2025-02-19 NOTE — PROGRESS NOTES
Subjective   Patient ID: Pastora Dowling is a 89 y.o. female who presents for Abdominal Pain.    HPI follow-up visit had missed her appointment yesterday no chest pain no shortness of breath skin has not been at her best using some moisturizer had slipped in the past on her marble floor landed on her buttock had some left-sided buttock and right sided shoulder discomfort has been more constipated as of late    Review of Systems    Objective   There were no vitals taken for this visit.    Physical Exam vital signs noted alert and oriented x 3 NCAT no JVD chest clear to auscultation CV regular rate and rhythm S1-S2 abdomen soft nontender normal active bowel sounds no flank tenderness LS spine normal curvature nontender spinous process there is no ecchymoses near the left buttock on the right shoulder okay range of motion limited posterior rotation extremities no clubbing cyanosis or edema normal distal pulses    Assessment/Plan     Impression constipation right shoulder pain left buttock pain hypertension other diagnoses  Plan range of motion exercises Tylenol and heating pad and then will also use either Colace or Senokot for the bowels good water consumption films if no better although less likely continue with other medications for heart and blood pressure and recheck 3 months based on above Endor sooner as needed

## 2025-02-24 DIAGNOSIS — F51.01 PRIMARY INSOMNIA: ICD-10-CM

## 2025-02-27 RX ORDER — ZOLPIDEM TARTRATE 10 MG/1
TABLET ORAL
Qty: 30 TABLET | Refills: 0 | Status: SHIPPED | OUTPATIENT
Start: 2025-02-27

## 2025-03-01 LAB
ALBUMIN SERPL-MCNC: 4.5 G/DL (ref 3.6–5.1)
ALP SERPL-CCNC: 89 U/L (ref 37–153)
ALT SERPL-CCNC: 10 U/L (ref 6–29)
ANION GAP SERPL CALCULATED.4IONS-SCNC: 10 MMOL/L (CALC) (ref 7–17)
AST SERPL-CCNC: 18 U/L (ref 10–35)
BILIRUB SERPL-MCNC: 0.7 MG/DL (ref 0.2–1.2)
BUN SERPL-MCNC: 25 MG/DL (ref 7–25)
CALCIUM SERPL-MCNC: 9.2 MG/DL (ref 8.6–10.4)
CHLORIDE SERPL-SCNC: 100 MMOL/L (ref 98–110)
CHOLEST SERPL-MCNC: 261 MG/DL
CHOLEST/HDLC SERPL: 3 (CALC)
CO2 SERPL-SCNC: 27 MMOL/L (ref 20–32)
CREAT SERPL-MCNC: 0.53 MG/DL (ref 0.6–0.95)
EGFRCR SERPLBLD CKD-EPI 2021: 88 ML/MIN/1.73M2
ERYTHROCYTE [DISTWIDTH] IN BLOOD BY AUTOMATED COUNT: 13.2 % (ref 11–15)
GLUCOSE SERPL-MCNC: 78 MG/DL (ref 65–139)
HCT VFR BLD AUTO: 39.3 % (ref 35–45)
HDLC SERPL-MCNC: 87 MG/DL
HGB BLD-MCNC: 13 G/DL (ref 11.7–15.5)
LDLC SERPL CALC-MCNC: 152 MG/DL (CALC)
MCH RBC QN AUTO: 31.3 PG (ref 27–33)
MCHC RBC AUTO-ENTMCNC: 33.1 G/DL (ref 32–36)
MCV RBC AUTO: 94.7 FL (ref 80–100)
NONHDLC SERPL-MCNC: 174 MG/DL (CALC)
PLATELET # BLD AUTO: 216 THOUSAND/UL (ref 140–400)
PMV BLD REES-ECKER: 11.7 FL (ref 7.5–12.5)
POTASSIUM SERPL-SCNC: 4.5 MMOL/L (ref 3.5–5.3)
PROT SERPL-MCNC: 7.1 G/DL (ref 6.1–8.1)
RBC # BLD AUTO: 4.15 MILLION/UL (ref 3.8–5.1)
SODIUM SERPL-SCNC: 137 MMOL/L (ref 135–146)
T4 FREE SERPL-MCNC: 1.6 NG/DL (ref 0.8–1.8)
TRIGL SERPL-MCNC: 102 MG/DL
TSH SERPL-ACNC: 0.22 MIU/L (ref 0.4–4.5)
WBC # BLD AUTO: 10.2 THOUSAND/UL (ref 3.8–10.8)

## 2025-03-12 DIAGNOSIS — I10 BENIGN ESSENTIAL HYPERTENSION: ICD-10-CM

## 2025-03-12 DIAGNOSIS — Z00.00 HEALTH CARE MAINTENANCE: ICD-10-CM

## 2025-03-13 RX ORDER — IPRATROPIUM BROMIDE 42 UG/1
2 SPRAY, METERED NASAL 4 TIMES DAILY
Qty: 15 ML | Refills: 1 | Status: SHIPPED | OUTPATIENT
Start: 2025-03-13

## 2025-03-13 RX ORDER — AMLODIPINE BESYLATE 5 MG/1
5 TABLET ORAL NIGHTLY
Qty: 30 TABLET | Refills: 2 | Status: SHIPPED | OUTPATIENT
Start: 2025-03-13

## 2025-03-17 ENCOUNTER — APPOINTMENT (OUTPATIENT)
Dept: PRIMARY CARE | Facility: CLINIC | Age: OVER 89
End: 2025-03-17
Payer: MEDICARE

## 2025-03-17 VITALS
RESPIRATION RATE: 12 BRPM | DIASTOLIC BLOOD PRESSURE: 78 MMHG | OXYGEN SATURATION: 97 % | SYSTOLIC BLOOD PRESSURE: 128 MMHG | HEART RATE: 75 BPM

## 2025-03-17 DIAGNOSIS — E03.9 HYPOTHYROIDISM, UNSPECIFIED TYPE: ICD-10-CM

## 2025-03-17 DIAGNOSIS — K21.9 GASTROESOPHAGEAL REFLUX DISEASE WITHOUT ESOPHAGITIS: ICD-10-CM

## 2025-03-17 DIAGNOSIS — E78.49 ESSENTIAL FAMILIAL HYPERLIPIDEMIA: ICD-10-CM

## 2025-03-17 DIAGNOSIS — M54.50 LOW BACK PAIN WITH RADIATION: ICD-10-CM

## 2025-03-17 DIAGNOSIS — G47.00 INSOMNIA, UNSPECIFIED TYPE: ICD-10-CM

## 2025-03-17 DIAGNOSIS — I10 BENIGN ESSENTIAL HYPERTENSION: ICD-10-CM

## 2025-03-17 DIAGNOSIS — Z00.00 HEALTH CARE MAINTENANCE: Primary | ICD-10-CM

## 2025-03-17 PROCEDURE — G2211 COMPLEX E/M VISIT ADD ON: HCPCS | Performed by: INTERNAL MEDICINE

## 2025-03-17 PROCEDURE — 3074F SYST BP LT 130 MM HG: CPT | Performed by: INTERNAL MEDICINE

## 2025-03-17 PROCEDURE — 3078F DIAST BP <80 MM HG: CPT | Performed by: INTERNAL MEDICINE

## 2025-03-17 PROCEDURE — 99214 OFFICE O/P EST MOD 30 MIN: CPT | Performed by: INTERNAL MEDICINE

## 2025-03-17 RX ORDER — DICYCLOMINE HYDROCHLORIDE 10 MG/1
10 CAPSULE ORAL 2 TIMES DAILY PRN
Qty: 30 CAPSULE | Refills: 1 | Status: SHIPPED | OUTPATIENT
Start: 2025-03-17 | End: 2025-05-16

## 2025-03-17 NOTE — PROGRESS NOTES
Subjective   Patient ID: Pastora Dowling is a 89 y.o. female who presents for No chief complaint on file..    HPI follow-up visit she has had difficult time sleeping every now and then still uses the Ambien risk-benefit side effect reviewed with her and wishes to proceed no chest pain no shortness of breath no palpitations occasionally with stomach upset she has used antacids which have been helpful she is used the dicyclomine which has been helpful she has had cardiac evaluation in the past her blood pressure is adequate bowels normal no dysuria and some low back pain and leg weakness she is in between rheumatologist but would like to go back to her physical therapy vital signs noted alert and oriented x 3 NCAT    Review of Systems    Objective   There were no vitals taken for this visit.    Physical Exam no JVD or bruit no lid lag no thyromegaly chest clear to auscultation CV regular rate and rhythm S1-S2 abdomen soft nontender normal active bowel sounds no rebound or guarding no HSM LS spine normal curvature nontender spinous process negative straight leg raise extremities no clubbing cyanosis or edema normal distal pulses DTR 1+ able to arise from chair with 2 arms uses cane to propel gait    Assessment/Plan    impression hypertension hyperlipidemia hypothyroidism low back pain with radiation GERD insomnia  Plan continue with blood pressure management and medication watching overall diet regular exercise as able including chair exercises reviewed prior lipids they have been elevated she prefers not to have any additional medication which is reasonable reviewed her thyroid function test T4 was normal will continue with same okay for physical therapy requisition made while she is in between rheumatologist May use Tylenol as needed heating pad as needed okay to avoid NSAIDs okay for PPI H2 blocker or Tums will also refill her Bentyl which has been helpful for some abdominal spasms see EMR good water  consumption sleep hygiene Spiering uses Ambien review prior studies and then recheck for regular visit Endor if no better    Tt40 cc21

## 2025-03-25 DIAGNOSIS — F51.01 PRIMARY INSOMNIA: ICD-10-CM

## 2025-03-25 RX ORDER — ZOLPIDEM TARTRATE 10 MG/1
TABLET ORAL
Qty: 30 TABLET | Refills: 0 | Status: SHIPPED | OUTPATIENT
Start: 2025-03-25

## 2025-04-11 DIAGNOSIS — I10 BENIGN ESSENTIAL HYPERTENSION: ICD-10-CM

## 2025-04-12 RX ORDER — AMLODIPINE BESYLATE 5 MG/1
5 TABLET ORAL NIGHTLY
Qty: 30 TABLET | Refills: 2 | Status: SHIPPED | OUTPATIENT
Start: 2025-04-12

## 2025-04-25 DIAGNOSIS — F51.01 PRIMARY INSOMNIA: ICD-10-CM

## 2025-04-26 RX ORDER — ZOLPIDEM TARTRATE 10 MG/1
TABLET ORAL
Qty: 30 TABLET | Refills: 0 | Status: SHIPPED | OUTPATIENT
Start: 2025-04-26

## 2025-04-28 ENCOUNTER — TELEPHONE (OUTPATIENT)
Dept: PRIMARY CARE | Facility: CLINIC | Age: OVER 89
End: 2025-04-28
Payer: COMMERCIAL

## 2025-04-28 ENCOUNTER — OFFICE VISIT (OUTPATIENT)
Dept: URGENT CARE | Age: OVER 89
End: 2025-04-28
Payer: MEDICARE

## 2025-04-28 VITALS
BODY MASS INDEX: 22.15 KG/M2 | TEMPERATURE: 97.8 F | OXYGEN SATURATION: 94 % | SYSTOLIC BLOOD PRESSURE: 130 MMHG | DIASTOLIC BLOOD PRESSURE: 66 MMHG | RESPIRATION RATE: 18 BRPM | WEIGHT: 125 LBS | HEART RATE: 88 BPM | HEIGHT: 63 IN

## 2025-04-28 DIAGNOSIS — Z00.00 HEALTH CARE MAINTENANCE: ICD-10-CM

## 2025-04-28 DIAGNOSIS — M70.21 OLECRANON BURSITIS OF RIGHT ELBOW: Primary | ICD-10-CM

## 2025-04-28 PROCEDURE — 99213 OFFICE O/P EST LOW 20 MIN: CPT | Performed by: STUDENT IN AN ORGANIZED HEALTH CARE EDUCATION/TRAINING PROGRAM

## 2025-04-28 PROCEDURE — 1036F TOBACCO NON-USER: CPT | Performed by: STUDENT IN AN ORGANIZED HEALTH CARE EDUCATION/TRAINING PROGRAM

## 2025-04-28 PROCEDURE — 1159F MED LIST DOCD IN RCRD: CPT | Performed by: STUDENT IN AN ORGANIZED HEALTH CARE EDUCATION/TRAINING PROGRAM

## 2025-04-28 PROCEDURE — 3078F DIAST BP <80 MM HG: CPT | Performed by: STUDENT IN AN ORGANIZED HEALTH CARE EDUCATION/TRAINING PROGRAM

## 2025-04-28 PROCEDURE — 1126F AMNT PAIN NOTED NONE PRSNT: CPT | Performed by: STUDENT IN AN ORGANIZED HEALTH CARE EDUCATION/TRAINING PROGRAM

## 2025-04-28 PROCEDURE — 3075F SYST BP GE 130 - 139MM HG: CPT | Performed by: STUDENT IN AN ORGANIZED HEALTH CARE EDUCATION/TRAINING PROGRAM

## 2025-04-28 ASSESSMENT — ENCOUNTER SYMPTOMS: JOINT SWELLING: 1

## 2025-04-28 ASSESSMENT — PAIN SCALES - GENERAL: PAINLEVEL_OUTOF10: 0-NO PAIN

## 2025-04-28 NOTE — PROGRESS NOTES
"Subjective   Patient ID: Pastora Dowling is a 89 y.o. female. They present today with a chief complaint of Joint Swelling (Right elbow swollen, fluid-filled. No pain.).    History of Present Illness  Pt presents with swelling to R elbow. X 1.5 weeks or so. No hx of similar. She believes she hit the elbow when in the bathroom but this was probably 2 or more months ago. Alan hx of other  trauma or repetitive use. She does have hx of arthritis. No hx of gout. The elbow is not painful, red, warm. There is no fever, chills, numbness tingling or weakness.       History provided by:  Patient      Past Medical History  Allergies as of 04/28/2025 - Reviewed 04/28/2025   Allergen Reaction Noted    Amoxicillin Unknown 03/15/2023    Anastrozole Myalgia 01/22/2019    Blue dye Hives 03/15/2023    Codeine Nausea Only 03/15/2023    Iodinated contrast media Rash 06/15/2006       Prescriptions Prior to Admission[1]     Medical History[2]    Surgical History[3]     reports that she has never smoked. She has never used smokeless tobacco. She reports that she does not currently use alcohol. She reports that she does not use drugs.    Review of Systems  Review of Systems   Musculoskeletal:  Positive for joint swelling.   All other systems reviewed and are negative.                                 Objective    Vitals:    04/28/25 1420   BP: 130/66   BP Location: Right arm   Patient Position: Sitting   BP Cuff Size: Adult   Pulse: 88   Resp: 18   Temp: 36.6 °C (97.8 °F)   TempSrc: Oral   SpO2: 94%   Weight: 56.7 kg (125 lb)   Height: 1.6 m (5' 3\")     No LMP recorded. (Menstrual status: Menopausal).    Physical Exam  Vitals and nursing note reviewed.   Constitutional:       General: She is not in acute distress.     Appearance: Normal appearance. She is not ill-appearing, toxic-appearing or diaphoretic.   Musculoskeletal:      Right elbow: Swelling present.      Comments: There is area of superficial round soft fluctuant swelling " directly over the olecranon process of the R elbow. It is not painful to touch. There is no overlying erythema or warmth or streaking. No wounds/abrasions. Full rom at elbow both active and passive without pain or difficulty. The rue is nvi.    Neurological:      Mental Status: She is alert.         Procedures    Point of Care Test & Imaging Results from this visit  No results found for this visit on 04/28/25.   Imaging  No results found.    Cardiology, Vascular, and Other Imaging  No other imaging results found for the past 2 days      Diagnostic study results (if any) were reviewed by Radha Crespo PA-C.    Assessment/Plan   Allergies, medications, history, and pertinent labs/EKGs/Imaging reviewed by Radha Crespo PA-C.     Medical Decision Making    Pt refused xray of the elbow today  Lower concern for gout, septic joint as there is no pain/erythema/wounds/normal strength rom both active and passive/warmth or constitutional sx  Advised rest, ice, elevation, compression with elbow pad or soft ACE wrap   Referred to orthopedics for bursal aspiration  Strict ED precautions d/w patient at length     Orders and Diagnoses  Diagnoses and all orders for this visit:  Olecranon bursitis of right elbow  -     Referral to Orthopedics and Sports Medicine; Future      Medical Admin Record      Patient disposition: Home    Electronically signed by Radha Crespo PA-C  2:39 PM           [1] (Not in a hospital admission)   [2] No past medical history on file.  [3]   Past Surgical History:  Procedure Laterality Date    BREAST LUMPECTOMY  12/15/2017    Left Breast Lumpectomy

## 2025-04-28 NOTE — PATIENT INSTRUCTIONS
Please go immediately to the emergency room if you develop pain in the elbow, worsening swelling, redness, warmth of the elbow, pain with moving the elbow or inability to move the elbow due to pain or swelling, fever or chills.

## 2025-05-01 RX ORDER — LEVOTHYROXINE SODIUM 100 UG/1
100 TABLET ORAL DAILY
Qty: 90 TABLET | Refills: 1 | Status: SHIPPED | OUTPATIENT
Start: 2025-05-01

## 2025-05-06 ENCOUNTER — APPOINTMENT (OUTPATIENT)
Dept: PHYSICAL THERAPY | Facility: CLINIC | Age: OVER 89
End: 2025-05-06
Payer: COMMERCIAL

## 2025-05-15 ENCOUNTER — TELEPHONE (OUTPATIENT)
Dept: PRIMARY CARE | Facility: CLINIC | Age: OVER 89
End: 2025-05-15
Payer: COMMERCIAL

## 2025-05-15 DIAGNOSIS — Z00.00 HEALTH CARE MAINTENANCE: Primary | ICD-10-CM

## 2025-05-21 NOTE — PROGRESS NOTES
Patient ID: Pastora Dowling is a 89 y.o. female.  Referring Physician: No referring provider defined for this encounter.  Primary Care Provider: Jerome Woodward MD    Subjective    Interval History: Overall feeling well. Denies any vaginal bleeding or abnormal discharge.    She denies fever, chills, chest pain, SOB, nausea, vomiting, diarrhea, constipation, dysuria, or any other concerning signs of symptoms.          Past Medical History: arthritis, HTN, hearing loss, breast cancer on tamoxifen, hypothyroidism, HLD     Past Surgical History: partial thyroidectomy, ablation, left breast lumpectomy     Family History: No history of ovarian/fallopian tube, endometrial, breast, pancreas, or GI cancers.      Social History: smoking: denies, alcohol use: occasional, other drug use: none    Objective    BSA: 1.62 meters squared  /75 (BP Location: Right arm, Patient Position: Sitting, BP Cuff Size: Adult)   Pulse 70   Temp 36.9 °C (98.4 °F) (Core)   Resp 18   Wt 59 kg (130 lb)   SpO2 91%   BMI 23.03 kg/m²      Physical Exam  Constitutional:       Appearance: Normal appearance. She is normal weight.   HENT:      Head: Normocephalic.      Mouth/Throat:      Mouth: Mucous membranes are moist.   Eyes:      Pupils: Pupils are equal, round, and reactive to light.   Cardiovascular:      Rate and Rhythm: Normal rate and regular rhythm.      Heart sounds: No murmur heard.     No friction rub. No gallop.   Pulmonary:      Effort: Pulmonary effort is normal.      Breath sounds: Normal breath sounds.   Abdominal:      General: Abdomen is flat. Bowel sounds are normal.      Palpations: Abdomen is soft.   Genitourinary:     Comments: Normal external female genitalia without lesions or masses  Speculum exam: Acetic acid applied. Smooth vagina without lesions or masses surgically absent cervix, vaginal canal is shortened from prior colpocleisis 3cm in length  Bimanual exam: smooth vagina without lesions or  masses      Musculoskeletal:         General: No swelling.   Skin:     General: Skin is warm and dry.   Neurological:      Mental Status: She is alert.         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 ECOG performance status: 1. Comorbidities include: arthritis, HTN, hearing loss, breast cancer on tamoxifen.     # VAIN  - Natural history of VAIN discussed with patient in detail. Discussed VaIN as a spectrum of disease ranging from low grade to high grade lesions. Pathology c/w with low-grade lesion with very low chance of progression of malignancy, especially in light of excision during colpocleisis. Nevertheless, recommend close surveillance although recognize this will be limited due to colpocleisis.      Normal exam, no visible lesions. RTC 6 months    SHAQUILLE Hair-CNP

## 2025-05-22 ENCOUNTER — OFFICE VISIT (OUTPATIENT)
Dept: GYNECOLOGIC ONCOLOGY | Facility: CLINIC | Age: OVER 89
End: 2025-05-22
Payer: MEDICARE

## 2025-05-22 VITALS
SYSTOLIC BLOOD PRESSURE: 153 MMHG | WEIGHT: 130 LBS | DIASTOLIC BLOOD PRESSURE: 75 MMHG | RESPIRATION RATE: 18 BRPM | TEMPERATURE: 98.4 F | HEART RATE: 70 BPM | BODY MASS INDEX: 23.03 KG/M2 | OXYGEN SATURATION: 91 %

## 2025-05-22 DIAGNOSIS — N89.0 VAIN I (VAGINAL INTRAEPITHELIAL NEOPLASIA GRADE I): Primary | ICD-10-CM

## 2025-05-22 DIAGNOSIS — N95.2 ATROPHY OF VAGINA: ICD-10-CM

## 2025-05-22 PROCEDURE — 3077F SYST BP >= 140 MM HG: CPT | Performed by: NURSE PRACTITIONER

## 2025-05-22 PROCEDURE — 3078F DIAST BP <80 MM HG: CPT | Performed by: NURSE PRACTITIONER

## 2025-05-22 PROCEDURE — 99214 OFFICE O/P EST MOD 30 MIN: CPT | Performed by: NURSE PRACTITIONER

## 2025-05-22 PROCEDURE — 1126F AMNT PAIN NOTED NONE PRSNT: CPT | Performed by: NURSE PRACTITIONER

## 2025-05-22 PROCEDURE — 1036F TOBACCO NON-USER: CPT | Performed by: NURSE PRACTITIONER

## 2025-05-22 PROCEDURE — G2211 COMPLEX E/M VISIT ADD ON: HCPCS | Performed by: NURSE PRACTITIONER

## 2025-05-22 PROCEDURE — 1159F MED LIST DOCD IN RCRD: CPT | Performed by: NURSE PRACTITIONER

## 2025-05-22 ASSESSMENT — PAIN SCALES - GENERAL: PAINLEVEL_OUTOF10: 0-NO PAIN

## 2025-05-23 DIAGNOSIS — F51.01 PRIMARY INSOMNIA: ICD-10-CM

## 2025-05-24 RX ORDER — ZOLPIDEM TARTRATE 10 MG/1
TABLET ORAL
Qty: 30 TABLET | Refills: 0 | Status: SHIPPED | OUTPATIENT
Start: 2025-05-24

## 2025-05-27 ENCOUNTER — TELEPHONE (OUTPATIENT)
Dept: PRIMARY CARE | Facility: CLINIC | Age: OVER 89
End: 2025-05-27

## 2025-05-27 DIAGNOSIS — F41.9 ANXIETY: ICD-10-CM

## 2025-05-29 ENCOUNTER — APPOINTMENT (OUTPATIENT)
Dept: PHYSICAL THERAPY | Facility: CLINIC | Age: OVER 89
End: 2025-05-29
Payer: COMMERCIAL

## 2025-06-02 ENCOUNTER — APPOINTMENT (OUTPATIENT)
Dept: PRIMARY CARE | Facility: CLINIC | Age: OVER 89
End: 2025-06-02
Payer: COMMERCIAL

## 2025-06-02 VITALS
OXYGEN SATURATION: 95 % | RESPIRATION RATE: 12 BRPM | SYSTOLIC BLOOD PRESSURE: 123 MMHG | HEART RATE: 70 BPM | DIASTOLIC BLOOD PRESSURE: 73 MMHG

## 2025-06-02 DIAGNOSIS — R14.0 ABDOMINAL BLOATING: ICD-10-CM

## 2025-06-02 DIAGNOSIS — H61.20 IMPACTED CERUMEN, UNSPECIFIED LATERALITY: ICD-10-CM

## 2025-06-02 DIAGNOSIS — I10 BENIGN ESSENTIAL HYPERTENSION: Primary | ICD-10-CM

## 2025-06-02 DIAGNOSIS — R42 DIZZINESS: ICD-10-CM

## 2025-06-02 NOTE — PROGRESS NOTES
Subjective   Patient ID: Pastora Dowling is a 89 y.o. female who presents for No chief complaint on file..    HPI  follow-up visit no chest pain no shortness of breath sometimes feels dizzy or lightheaded sometimes her stomach acts up has been sleeping okay there have been no falls has had some issues with her right ear she has a hearing aid on that side she is deaf her on the left side    Review of Systems    Objective   There were no vitals taken for this visit.    Physical Exam  Vital signs noted alert and oriented x 3 NCAT no JVD or bruit chest clear to auscultation CV regular rate and rhythm S1-S2 without murmur gallop or rub or skip extremities no clubbing cyanosis or edema normal distal pulses walk with cane TM EAC clear on the left on the right EAC partially occluded with cerumen    Multiple loop removal of EAC cerumen no complications TM viewed and no erythema or air-fluid level  No complication  Assessment/Plan    impression hypertension cerumen impaction dizziness abdominal distention   Plan she also has extensive psoriasis especially on her scalp and may need a new dermatologist as her son is retired  Also with some right elbow swelling consistent with olecranon bursitis may use ice on that  Did not want or need additional blood work at this time  Okay for Debrox as needed go to the audiologist to recalibrate her hearing aid  Continue with blood pressure management and medication  Good fiber in diet okay for the Bentyl when needed  Discussed with anxiety she would like to be on the paroxetine medication as she was in the past  Stay well hydrated then recheck for regular visit in 4 blood pressure 3 months TT 50 cc 26    Call into prescriptions

## 2025-06-04 ENCOUNTER — APPOINTMENT (OUTPATIENT)
Dept: PHYSICAL THERAPY | Facility: CLINIC | Age: OVER 89
End: 2025-06-04
Payer: COMMERCIAL

## 2025-06-05 RX ORDER — PAROXETINE 10 MG/1
TABLET, FILM COATED ORAL
Qty: 30 TABLET | Refills: 1 | Status: SHIPPED | OUTPATIENT
Start: 2025-06-05

## 2025-06-11 ENCOUNTER — APPOINTMENT (OUTPATIENT)
Dept: PHYSICAL THERAPY | Facility: CLINIC | Age: OVER 89
End: 2025-06-11
Payer: COMMERCIAL

## 2025-06-23 DIAGNOSIS — F51.01 PRIMARY INSOMNIA: ICD-10-CM

## 2025-06-26 DIAGNOSIS — F51.01 PRIMARY INSOMNIA: ICD-10-CM

## 2025-06-26 RX ORDER — ZOLPIDEM TARTRATE 10 MG/1
TABLET ORAL
Qty: 30 TABLET | Refills: 0 | OUTPATIENT
Start: 2025-06-26

## 2025-06-26 RX ORDER — ZOLPIDEM TARTRATE 10 MG/1
TABLET ORAL
Qty: 30 TABLET | Refills: 0 | Status: SHIPPED | OUTPATIENT
Start: 2025-06-26

## 2025-06-30 ENCOUNTER — TELEPHONE (OUTPATIENT)
Dept: PRIMARY CARE | Facility: CLINIC | Age: OVER 89
End: 2025-06-30
Payer: COMMERCIAL

## 2025-06-30 DIAGNOSIS — I10 BENIGN ESSENTIAL HYPERTENSION: Primary | ICD-10-CM

## 2025-07-09 RX ORDER — AMLODIPINE BESYLATE 5 MG/1
5 TABLET ORAL NIGHTLY
Qty: 30 TABLET | Refills: 2 | Status: SHIPPED | OUTPATIENT
Start: 2025-07-09

## 2025-07-24 DIAGNOSIS — Z00.00 HEALTH CARE MAINTENANCE: ICD-10-CM

## 2025-07-24 DIAGNOSIS — F51.01 PRIMARY INSOMNIA: ICD-10-CM

## 2025-07-25 RX ORDER — LEVOTHYROXINE SODIUM 100 UG/1
100 TABLET ORAL DAILY
Qty: 90 TABLET | Refills: 1 | Status: SHIPPED | OUTPATIENT
Start: 2025-07-25

## 2025-07-26 RX ORDER — ZOLPIDEM TARTRATE 10 MG/1
TABLET ORAL
Qty: 30 TABLET | Refills: 0 | Status: SHIPPED | OUTPATIENT
Start: 2025-07-26

## 2025-08-25 DIAGNOSIS — F51.01 PRIMARY INSOMNIA: ICD-10-CM

## 2025-08-27 ENCOUNTER — APPOINTMENT (OUTPATIENT)
Dept: PRIMARY CARE | Facility: CLINIC | Age: OVER 89
End: 2025-08-27
Payer: COMMERCIAL

## 2025-08-27 RX ORDER — ZOLPIDEM TARTRATE 10 MG/1
TABLET ORAL
Qty: 30 TABLET | Refills: 0 | Status: SHIPPED | OUTPATIENT
Start: 2025-08-27